# Patient Record
Sex: FEMALE | Race: BLACK OR AFRICAN AMERICAN | Employment: FULL TIME | URBAN - METROPOLITAN AREA
[De-identification: names, ages, dates, MRNs, and addresses within clinical notes are randomized per-mention and may not be internally consistent; named-entity substitution may affect disease eponyms.]

---

## 2021-01-28 ENCOUNTER — HOSPITAL ENCOUNTER (EMERGENCY)
Age: 21
Discharge: HOME | End: 2021-01-28
Attending: EMERGENCY MEDICINE
Payer: COMMERCIAL

## 2021-01-28 VITALS
TEMPERATURE: 97.3 F | RESPIRATION RATE: 16 BRPM | WEIGHT: 240 LBS | SYSTOLIC BLOOD PRESSURE: 140 MMHG | DIASTOLIC BLOOD PRESSURE: 95 MMHG | OXYGEN SATURATION: 99 % | HEART RATE: 98 BPM

## 2021-01-28 DIAGNOSIS — N39.0 URINARY TRACT INFECTION WITHOUT HEMATURIA, SITE UNSPECIFIED: Primary | ICD-10-CM

## 2021-01-28 LAB
-: ABNORMAL
AMORPHOUS: ABNORMAL
BACTERIA: ABNORMAL
BILIRUBIN URINE: NEGATIVE
CASTS UA: ABNORMAL /LPF (ref 0–8)
COLOR: YELLOW
CRYSTALS, UA: ABNORMAL /HPF
EPITHELIAL CELLS UA: ABNORMAL /HPF (ref 0–5)
GLUCOSE URINE: NEGATIVE
HCG(URINE) PREGNANCY TEST: NEGATIVE
KETONES, URINE: ABNORMAL
LEUKOCYTE ESTERASE, URINE: ABNORMAL
MUCUS: ABNORMAL
NITRITE, URINE: NEGATIVE
OTHER OBSERVATIONS UA: ABNORMAL
PH UA: 5.5 (ref 5–8)
PROTEIN UA: ABNORMAL
RBC UA: ABNORMAL /HPF (ref 0–4)
RENAL EPITHELIAL, UA: ABNORMAL /HPF
SPECIFIC GRAVITY UA: 1.02 (ref 1–1.03)
TRICHOMONAS: ABNORMAL
TURBIDITY: ABNORMAL
URINE HGB: ABNORMAL
UROBILINOGEN, URINE: NORMAL
WBC UA: ABNORMAL /HPF (ref 0–5)
YEAST: ABNORMAL

## 2021-01-28 PROCEDURE — 81001 URINALYSIS AUTO W/SCOPE: CPT

## 2021-01-28 PROCEDURE — 81025 URINE PREGNANCY TEST: CPT

## 2021-01-28 PROCEDURE — 99282 EMERGENCY DEPT VISIT SF MDM: CPT

## 2021-01-28 RX ORDER — CEPHALEXIN 500 MG/1
500 CAPSULE ORAL 4 TIMES DAILY
Qty: 28 CAPSULE | Refills: 0 | Status: SHIPPED | OUTPATIENT
Start: 2021-01-28 | End: 2021-02-04

## 2021-01-28 NOTE — PROGRESS NOTES
This patient was assigned to me by error. This patient was never interviewed nor examined by me. I did not participate in the care of this patient.     Zahra Palmer, PGY-3

## 2021-01-28 NOTE — ED PROVIDER NOTES
[address]  Emergency Department Encounter  Emergency Medicine Resident         This patient was evaluated in the Emergency Department for symptoms described in the history of present illness. He/she was evaluated in the context of the global COVID-19 pandemic, which necessitated consideration that the patient might be at risk for infection with the SARS-CoV-2 virus that causes COVID-19. Institutional protocols and algorithms that pertain to the evaluation of patients at risk for COVID-19 are in a state of rapid change based on information released by regulatory bodies including the CDC and federal and state organizations. These policies and algorithms were followed during the patient's care in the ED. Pt Name: Emigdio Liz  MRN: [id number]  [place] 2000  Date of evaluation: 1/28/21  PCP:  No primary care provider on file. CHIEF COMPLAINT       Chief Complaint   Patient presents with    Abdominal Pain     \"stomach feels tight\" lower pelvic pain    Pregnancy Test       HISTORY OF PRESENT ILLNESS  (Location/Symptom, Timing/Onset, Context/Setting, Quality, Duration, Modifying Factors, Severity.)    Emigdio Liz is a 24 y.o. female who presents with vomiting x2 with ? Streaks of blood yesterday with missed period by more than 1 month. She is sexually active with one partner not using protection. Associated epigastric abdm pain x1 week. Intermittent, no radiation, 7/10 severity. No obvious triggers. She states \"I dont take medications\". No vaginal bleeding or discharge. No urinary sx. Not coughing up blood, no bloody BMs, denies tobacco alcohol or drug use.            PAST MEDICAL / SURGICAL / SOCIAL / FAMILY HISTORY   Denies PMH and PSH     Social History     Socioeconomic History    Marital status: Single     Spouse name: Not on file    Number of children: Not on file    Years of education: Not on file    Highest education level: Not on file   Occupational History    Not on file   Social Needs    Financial resource strain: Not on file    Food insecurity     Worry: Not on file     Inability: Not on file    Transportation needs     Medical: Not on file     Non-medical: Not on file   Tobacco Use    Smoking status: Never Smoker    Smokeless tobacco: Never Used   Substance and Sexual Activity    Alcohol use: Yes    Drug use: Never    Sexual activity: Yes     Partners: Male   Lifestyle    Physical activity     Days per week: Not on file     Minutes per session: Not on file    Stress: Not on file   Relationships    Social connections     Talks on phone: Not on file     Gets together: Not on file     Attends Tenriism service: Not on file     Active member of club or organization: Not on file     Attends meetings of clubs or organizations: Not on file     Relationship status: Not on file    Intimate partner violence     Fear of current or ex partner: Not on file     Emotionally abused: Not on file     Physically abused: Not on file     Forced sexual activity: Not on file   Other Topics Concern    Not on file   Social History Narrative    Not on file       History reviewed. No pertinent family history. Allergies:    Patient has no known allergies. Home Medications:  Prior to Admission medications    Medication Sig Start Date End Date Taking? Authorizing Provider   cephALEXin (KEFLEX) 500 MG capsule Take 1 capsule by mouth 4 times daily for 7 days 1/28/21 2/4/21 Yes Fer [facility], DO       REVIEW OF SYSTEMS    (2-9 systems for level 4, 10 or more for level 5)    A 10 point review of systems was performed and negative unless otherwise specified in HPI  Gen: No Fever, No chills  EYES: No blurry visiion, no double vision  HENT: No sore throat, No runny nose.  No cough  CV: No CP , No palpitation  RESP: No SOB, No respiratory distress  GI: No N/V, No Abdm pain  : No dysuria  SKIN: No rash  MSK: No back pain, no joint pain  NEURO: No HA, no weakness  PSYCH: No SI/HI        PHYSICAL EXAM   (up to 7 for level 4, 8 or more for level 5)     INITIAL VITALS:     BP (!) 140/95   Pulse 98   Temp 97.3 °F (36.3 °C) (Oral)   Resp 16   Wt 240 lb (108.9 kg)   LMP 12/18/2020   SpO2 99%     Physical Exam  GENERAL: upon initial examination, patient is well appearing, nontoxic, and not in acute respiratory distress. Ambulating without difficulty, awake alert coop. She is drinking a coca cola without difficulty. HENT: normocephalic , nose normal,   EYES: no occular discharge, no scleral icterus  NECK: no JVD, no tracheal deviation  CV: Normal S1 S2, no MRG  PULM / CHEST: CTA Bilaterally all fields, no WRR  ABDOMEN: SNTND, No peritoneal signs   / ANORECTAL: deferred per pt request  MSK: no gross deformity, no edema, no TTP  SKIN: no rash, no erythema, cap refill < 2 sec       DIFFERENTIAL  DIAGNOSIS/ MDM   PLAN (LABS / IMAGING / EKG):  Orders Placed This Encounter   Procedures    Urinalysis with microscopic    Pregnancy, Urine       MEDICATIONS ORDERED:  Orders Placed This Encounter   Medications    cephALEXin (KEFLEX) 500 MG capsule     Sig: Take 1 capsule by mouth 4 times daily for 7 days     Dispense:  28 capsule     Refill:  0         MDM:    Brianne Burnette is a 24 y.o. female who presents with missed period, epigastric pain and vom. No sx today. Concerned she may be pregnant. Abdm is benign. Not dehydrated. No vag sx or urinary sx. Will check Preg and UA.            EMERGENCY DEPARTMENT COURSE:  ED Course as of Jan 28 1547   Thu Jan 28, 2021   1533 HCG(Urine) Pregnancy Test: NEGATIVE [RB]   1546 uti    [RB]      ED Course User Index  [RB] [facility],          DIAGNOSTIC RESULTS / EMERGENCYDEPARTMENT COURSE   LABS:  Labs Reviewed   URINALYSIS WITH MICROSCOPIC - Abnormal; Notable for the following components:       Result Value    Turbidity UA TURBID (*)     Ketones, Urine TRACE (*)     Urine Hgb TRACE (*)     Protein, UA TRACE (*)     Leukocyte Esterase, Urine MODERATE (*)     Bacteria, UA MODERATE (*)     All other components within normal limits   PREGNANCY, URINE       RADIOLOGY:  No results found. CONSULTS:  None    CRITICAL CARE:  Please see attending note    FINAL IMPRESSION     1. Urinary tract infection without hematuria, site unspecified          DISPOSITION / PLAN   DISPOSITION Decision To Discharge 01/28/2021 03:46:45 PM       Evaluation and treatment course in the ED, and plan of care upon discharge was discussed in length with the patient. Patient had no further questions prior to being discharged and was instructed to return to the ED for new or worsening symptoms. Any changes to existing medications or new prescriptions were reviewed with patient and they expressed understanding of how to correctly take their medications and the possible common side effects. The patient understands that at this time there is no evidence for a more malignant underlying process, but the patient also understands that early in the process of an illness or injury, an emergency department workup can be falsely reassuring. Routine discharge counseling was given, and the patient understands that worsening, changing or persistent symptoms should prompt an immediate call or follow up with his/her primary physician or return to the emergency department. The importance of appropriate follow up was also discussed. More extensive discharge instructions were given in the patients discharge paperwork. PATIENT REFERRED TO:  No follow-up provider specified. DISCHARGE MEDICATIONS:  New Prescriptions    CEPHALEXIN (KEFLEX) 500 MG CAPSULE    Take 1 capsule by mouth 4 times daily for 7 days       Dr. Luis Fernando Swann.  [facility]  Emergency Medicine Resident Physician, PGY-3    (Please note that portions of this note were completed with a voice recognition program.  Efforts were made to edit the dictations but occasionally words are mis-transcribed.)          Mary Barron, DO  Resident  01/28/21 1542

## 2021-01-28 NOTE — ED NOTES
Pt presented to ED for lower abd pain and missed period. Pt denies vaginal discharge or vaginal bleeding. Pt complains of nausea.       Smith Laurent RN  01/28/21 9935

## 2021-01-28 NOTE — ED PROVIDER NOTES
[address] [facility]     Emergency Department     Faculty Attestation    I performed a history and physical examination of the patient and discussed management with the resident. I have reviewed and agree with the residents findings including all diagnostic interpretations, and treatment plans as written. Any areas of disagreement are noted on the chart. I was personally present for the key portions of any procedures. I have documented in the chart those procedures where I was not present during the key portions. I have reviewed the emergency nurses triage note. I agree with the chief complaint, past medical history, past surgical history, allergies, medications, social and family history as documented unless otherwise noted below. Documentation of the HPI, Physical Exam and Medical Decision Making performed by patricioiblizet is based on my personal performance of the HPI, PE and MDM. For Physician Assistant/ Nurse Practitioner cases/documentation I have personally evaluated this patient and have completed at least one if not all key elements of the E/M (history, physical exam, and MDM). Additional findings are as noted. Well-appearing on exam with soft abdomen, that is nontender to palpation all quadrants. Reports episode of emesis with some streaks of blood present. No pain at this time. Last menstrual period was December 18. She states she is very irregular and currently late. No vaginal bleeding no vaginal discharge.   No dysuria or hematuria no back pain no fevers or chills    Gabriel Richardson D.O, M.P.H  Attending Emergency Medicine Physician         Gabriel Richardson DO  01/28/21 0527

## 2021-05-15 ENCOUNTER — HOSPITAL ENCOUNTER (EMERGENCY)
Age: 21
Discharge: HOME | End: 2021-05-15
Attending: EMERGENCY MEDICINE
Payer: COMMERCIAL

## 2021-05-15 VITALS
RESPIRATION RATE: 16 BRPM | DIASTOLIC BLOOD PRESSURE: 92 MMHG | HEART RATE: 91 BPM | OXYGEN SATURATION: 100 % | TEMPERATURE: 98.7 F | SYSTOLIC BLOOD PRESSURE: 141 MMHG

## 2021-05-15 DIAGNOSIS — N30.00 ACUTE CYSTITIS WITHOUT HEMATURIA: Primary | ICD-10-CM

## 2021-05-15 DIAGNOSIS — R03.0 BLOOD PRESSURE ELEVATED WITHOUT HISTORY OF HTN: ICD-10-CM

## 2021-05-15 LAB
-: ABNORMAL
AMORPHOUS: ABNORMAL
BACTERIA: ABNORMAL
BILIRUBIN URINE: NEGATIVE
CASTS UA: ABNORMAL /LPF (ref 0–8)
COLOR: YELLOW
COMMENT UA: ABNORMAL
CRYSTALS, UA: ABNORMAL /HPF
EPITHELIAL CELLS UA: ABNORMAL /HPF (ref 0–5)
GLUCOSE URINE: NEGATIVE
HCG(URINE) PREGNANCY TEST: NEGATIVE
KETONES, URINE: NEGATIVE
LEUKOCYTE ESTERASE, URINE: ABNORMAL
MUCUS: ABNORMAL
NITRITE, URINE: NEGATIVE
OTHER OBSERVATIONS UA: ABNORMAL
PH UA: 5.5 (ref 5–8)
PROTEIN UA: NEGATIVE
RBC UA: ABNORMAL /HPF (ref 0–4)
RENAL EPITHELIAL, UA: ABNORMAL /HPF
SPECIFIC GRAVITY UA: 1.02 (ref 1–1.03)
TRICHOMONAS: ABNORMAL
TURBIDITY: CLEAR
URINE HGB: NEGATIVE
UROBILINOGEN, URINE: NORMAL
WBC UA: ABNORMAL /HPF (ref 0–5)
YEAST: ABNORMAL

## 2021-05-15 PROCEDURE — 81025 URINE PREGNANCY TEST: CPT

## 2021-05-15 PROCEDURE — 81001 URINALYSIS AUTO W/SCOPE: CPT

## 2021-05-15 PROCEDURE — 87086 URINE CULTURE/COLONY COUNT: CPT

## 2021-05-15 PROCEDURE — 99281 EMR DPT VST MAYX REQ PHY/QHP: CPT

## 2021-05-15 RX ORDER — CEPHALEXIN 500 MG/1
500 CAPSULE ORAL 2 TIMES DAILY
Qty: 28 CAPSULE | Refills: 0 | Status: SHIPPED | OUTPATIENT
Start: 2021-05-15 | End: 2021-05-22

## 2021-05-15 ASSESSMENT — ENCOUNTER SYMPTOMS
BLOOD IN STOOL: 0
SORE THROAT: 0
NAUSEA: 0
ABDOMINAL PAIN: 0
CONSTIPATION: 0
SHORTNESS OF BREATH: 0
DIARRHEA: 0
VOMITING: 0
COUGH: 0

## 2021-05-15 NOTE — ED PROVIDER NOTES
Difficulty of Paying Living Expenses:    Food Insecurity:     Worried About Running Out of Food in the Last Year:     [address] in the Last Year:    Transportation Needs:     Lack of Transportation (Medical):  Lack of Transportation (Non-Medical):    Physical Activity:     Days of Exercise per Week:     Minutes of Exercise per Session:    Stress:     Feeling of Stress :    Social Connections:     Frequency of Communication with Friends and Family:     Frequency of Social Gatherings with Friends and Family:     Attends Anabaptist Services:     Active Member of Clubs or Organizations:     Attends Club or Organization Meetings:     Marital Status:    Intimate Partner Violence:     Fear of Current or Ex-Partner:     Emotionally Abused:     Physically Abused:     Sexually Abused:        No family history on file. Allergies:  Patient has no known allergies. Home Medications:  Prior to Admission medications    Medication Sig Start Date End Date Taking? Authorizing Provider   cephALEXin (KEFLEX) 500 MG capsule Take 1 capsule by mouth 2 times daily for 7 days 5/15/21 5/22/21 Yes Jeanne Pedro, DO       REVIEW OF SYSTEMS    (2-9 systems for level 4, 10 or more for level 5)      Review of Systems   Constitutional: Negative for chills, diaphoresis, fatigue and fever. HENT: Negative for congestion and sore throat. Eyes: Negative for visual disturbance. Respiratory: Negative for cough and shortness of breath. Cardiovascular: Negative for chest pain, palpitations and leg swelling. Gastrointestinal: Negative for abdominal pain, blood in stool, constipation, diarrhea, nausea and vomiting. Endocrine: Negative for polydipsia, polyphagia and polyuria. Genitourinary: Positive for frequency (Increased). Negative for difficulty urinating, dysuria, flank pain, hematuria, urgency, vaginal bleeding and vaginal discharge. Musculoskeletal: Negative for arthralgias and myalgias.    Skin: oriented to person, place, and time. Motor: No abnormal muscle tone. DIFFERENTIAL  DIAGNOSIS     PLAN (LABS / IMAGING / EKG):  Orders Placed This Encounter   Procedures    Culture, Urine    Urinalysis Reflex to Culture    PREGNANCY, URINE    Microscopic Urinalysis       MEDICATIONS ORDERED:  Orders Placed This Encounter   Medications    cephALEXin (KEFLEX) 500 MG capsule     Sig: Take 1 capsule by mouth 2 times daily for 7 days     Dispense:  28 capsule     Refill:  0       DDX: UTI, pregnancy    DIAGNOSTIC RESULTS / EMERGENCY DEPARTMENT COURSE / MDM   LAB RESULTS:  Results for orders placed or performed during the hospital encounter of 05/15/21   Urinalysis Reflex to Culture    Specimen: Urine, clean catch   Result Value Ref Range    Color, UA YELLOW YELLOW    Turbidity UA CLEAR CLEAR    Glucose, Ur NEGATIVE NEGATIVE    Bilirubin Urine NEGATIVE NEGATIVE    Ketones, Urine NEGATIVE NEGATIVE    Specific Gravity, UA 1.023 1.005 - 1.030    Urine Hgb NEGATIVE NEGATIVE    pH, UA 5.5 5.0 - 8.0    Protein, UA NEGATIVE NEGATIVE    Urobilinogen, Urine Normal Normal    Nitrite, Urine NEGATIVE NEGATIVE    Leukocyte Esterase, Urine MODERATE (A) NEGATIVE    Urinalysis Comments NOT REPORTED    PREGNANCY, URINE   Result Value Ref Range    HCG(Urine) Pregnancy Test NEGATIVE NEGATIVE   Microscopic Urinalysis   Result Value Ref Range    -          WBC, UA 50  0 - 5 /HPF    RBC, UA 2 TO 5 0 - 4 /HPF    Casts UA  0 - 8 /LPF     5 TO 10 HYALINE Reference range defined for non-centrifuged specimen. Crystals, UA NOT REPORTED None /HPF    Epithelial Cells UA 5 TO 10 0 - 5 /HPF    Renal Epithelial, UA NOT REPORTED 0 /HPF    Bacteria, UA FEW (A) None    Mucus, UA NOT REPORTED None    Trichomonas, UA NOT REPORTED None    Amorphous, UA NOT REPORTED None    Other Observations UA NOT REPORTED NOT REQ.     Yeast, UA NOT REPORTED None       IMPRESSION: 23-year-old female presents for urinary frequencies with concerns for UTI.  Patient peers to be no acute distress nontoxic-appearing. Patient initial vitals are concerning for slight hypertension 141/92 does not need to medically manage at this time. Patient's vitals are otherwise stable nonconcerning. Abdomen is benign. No signs respiratory distress. Cap refill is in 2. Concern for above differential diagnosis. Will order urine dialysis with reflex culture and urine pregnancy test.  Plan to discharge home and have her follow-up with primary care provider for reevaluation of her hypertension and possible UTI. RADIOLOGY:  none    EKG  none    All EKG's are interpreted by the Emergency Department Physician who either signs or Co-signs this chart in the absence of a cardiologist.    EMERGENCY DEPARTMENT COURSE:  ED Course as of May 15 1225   Sat May 15, 2021   1153 Leukocyte Esterase, Urine(!): MODERATE [CS]   1153 WBC, UA: 50  [CS]   9949 Since she is symptomatic, will treat with keflex and have her follow up with her PCP in 5 days. Bacteria, UA(!): FEW [CS]   2005 She was updated on her blood pressure and urinary tract infection. Patient was given a dose of Keflex while here. Patient was agreeable with discharge plan. Patient was educated return precautions. [CS]      ED Course User Index  [CS] Gemini Fails, DO         PROCEDURES:  none    CONSULTS:  None    CRITICAL CARE:  Please see attending note    FINAL IMPRESSION      1. Acute cystitis without hematuria    2.  Blood pressure elevated without history of HTN          DISPOSITION / PLAN     DISPOSITION Decision To Discharge 05/15/2021 11:54:00 AM      PATIENT REFERRED TO:  [facility] ED  [address] [place] [address]  [phone]  Go to   If symptoms worsen      DISCHARGE MEDICATIONS:  Discharge Medication List as of 5/15/2021 12:02 PM      START taking these medications    Details   cephALEXin (KEFLEX) 500 MG capsule Take 1 capsule by mouth 2 times daily for 7 days, Disp-28 capsule, R-0Normal             Jami Rodriguez DO  Emergency Medicine Resident    (Please note that portions of thisnote were completed with a voice recognition program.  Efforts were made to edit the dictations but occasionally words are mis-transcribed.)       Jami Rodriguez DO  Resident  05/15/21 1739

## 2021-05-15 NOTE — ED PROVIDER NOTES
[address] [facility]     Emergency Department     Faculty Attestation    I performed a history and physical examination of the patient and discussed management with the resident. I have reviewed and agree with the residents findings including all diagnostic interpretations, and treatment plans as written at the time of my review. Any areas of disagreement are noted on the chart. I was personally present for the key portions of any procedures. I have documented in the chart those procedures where I was not present during the key portions. For Physician Assistant/ Nurse Practitioner cases/documentation I have personally evaluated this patient and have completed at least one if not all key elements of the E/M (history, physical exam, and MDM). Additional findings are as noted. This patient was evaluated in the Emergency Department for symptoms described in the history of present illness. The patient was evaluated in the context of the global COVID-19 pandemic, which necessitated consideration that the patient might be at risk for infection with the SARS-CoV-2 virus that causes COVID-19. Institutional protocols and algorithms that pertain to the evaluation of patients at risk for COVID-19 are in a state of rapid change based on information released by regulatory bodies including the CDC and federal and state organizations. These policies and algorithms were followed during the patient's care in the ED. Primary Care Physician: No primary care provider on file. History: This is a 24 y.o. female who presents to the Emergency Department with complaint of concerns for urinary tract flexion. The patient states she was given a prescription for urine tract flexion proxy 1 month ago but lost prescription. Says she has no dysuria but does have some increased frequency of urination. Last menstrual period was last week. She denies any abdominal pain.   She denies any

## 2021-05-16 LAB
CULTURE: NORMAL
Lab: NORMAL
SPECIMEN DESCRIPTION: NORMAL

## 2021-06-11 ENCOUNTER — HOSPITAL ENCOUNTER (EMERGENCY)
Age: 21
Discharge: HOME | End: 2021-06-11
Attending: EMERGENCY MEDICINE
Payer: COMMERCIAL

## 2021-06-11 VITALS
DIASTOLIC BLOOD PRESSURE: 89 MMHG | TEMPERATURE: 99.2 F | SYSTOLIC BLOOD PRESSURE: 131 MMHG | OXYGEN SATURATION: 98 % | RESPIRATION RATE: 16 BRPM | HEART RATE: 67 BPM

## 2021-06-11 DIAGNOSIS — H10.9 CONJUNCTIVITIS OF LEFT EYE, UNSPECIFIED CONJUNCTIVITIS TYPE: Primary | ICD-10-CM

## 2021-06-11 PROCEDURE — 6370000000 HC RX 637 (ALT 250 FOR IP): Performed by: STUDENT IN AN ORGANIZED HEALTH CARE EDUCATION/TRAINING PROGRAM

## 2021-06-11 PROCEDURE — 99283 EMERGENCY DEPT VISIT LOW MDM: CPT

## 2021-06-11 RX ORDER — POLYMYXIN B SULFATE AND TRIMETHOPRIM 1; 10000 MG/ML; [USP'U]/ML
2 SOLUTION OPHTHALMIC EVERY 6 HOURS
Qty: 1 BOTTLE | Refills: 0 | Status: SHIPPED | OUTPATIENT
Start: 2021-06-11 | End: 2021-06-18

## 2021-06-11 RX ORDER — POLYMYXIN B SULFATE AND TRIMETHOPRIM 1; 10000 MG/ML; [USP'U]/ML
2 SOLUTION OPHTHALMIC EVERY 6 HOURS SCHEDULED
Status: DISCONTINUED | OUTPATIENT
Start: 2021-06-11 | End: 2021-06-11 | Stop reason: HOSPADM

## 2021-06-11 RX ADMIN — POLYMYXIN B SULFATE AND TRIMETHOPRIM 2 DROP: 10000; 1 SOLUTION OPHTHALMIC at 11:35

## 2021-06-11 ASSESSMENT — ENCOUNTER SYMPTOMS
EYE REDNESS: 1
SORE THROAT: 0
EYE DISCHARGE: 1
SINUS PAIN: 0
EYE PAIN: 1
PHOTOPHOBIA: 0
TROUBLE SWALLOWING: 0

## 2021-06-11 ASSESSMENT — PAIN DESCRIPTION - DESCRIPTORS: DESCRIPTORS: BURNING;CONSTANT

## 2021-06-11 ASSESSMENT — PAIN DESCRIPTION - ORIENTATION: ORIENTATION: LEFT

## 2021-06-11 ASSESSMENT — PAIN SCALES - GENERAL: PAINLEVEL_OUTOF10: 8

## 2021-06-11 ASSESSMENT — PAIN DESCRIPTION - LOCATION: LOCATION: EYE

## 2021-06-11 ASSESSMENT — PAIN DESCRIPTION - PAIN TYPE: TYPE: ACUTE PAIN

## 2021-06-11 NOTE — ED PROVIDER NOTES
(Non-Medical):    Physical Activity:     Days of Exercise per Week:     Minutes of Exercise per Session:    Stress:     Feeling of Stress :    Social Connections:     Frequency of Communication with Friends and Family:     Frequency of Social Gatherings with Friends and Family:     Attends Voodoo Services:     Active Member of Clubs or Organizations:     Attends Club or Organization Meetings:     Marital Status:    Intimate Partner Violence:     Fear of Current or Ex-Partner:     Emotionally Abused:     Physically Abused:     Sexually Abused:        History reviewed. No pertinent family history. Allergies:  Patient has no known allergies. Home Medications:  Prior to Admission medications    Medication Sig Start Date End Date Taking? Authorizing Provider   trimethoprim-polymyxin b (POLYTRIM) 97656-6.1 UNIT/ML-% ophthalmic solution Place 2 drops into the left eye every 6 hours for 7 days 6/11/21 6/18/21 Yes Bety Reid MD       REVIEW OF SYSTEMS    (2-9 systems for level 4, 10 or more for level 5)      Review of Systems   Constitutional: Negative for fatigue and fever. HENT: Negative for sinus pain, sore throat and trouble swallowing. Eyes: Positive for pain, discharge and redness. Negative for photophobia and visual disturbance. Skin: Negative for wound. Neurological: Negative for dizziness, syncope, facial asymmetry, weakness, light-headedness, numbness and headaches. Psychiatric/Behavioral: Negative for confusion. PHYSICAL EXAM   (up to 7 for level 4, 8 or more for level 5)      INITIAL VITALS:   /89   Pulse 67   Temp 99.2 °F (37.3 °C) (Oral)   Resp 16   SpO2 98%     Physical Exam  Vitals and nursing note reviewed. Constitutional:       General: She is not in acute distress. Appearance: Normal appearance. She is normal weight. She is not ill-appearing or toxic-appearing. HENT:      Head: Normocephalic and atraumatic.       Right Ear: External ear normal. Left Ear: External ear normal.      Nose: Nose normal.      Mouth/Throat:      Mouth: Mucous membranes are moist.      Pharynx: No oropharyngeal exudate. Eyes:      General:         Right eye: No discharge. Left eye: Discharge present. Extraocular Movements: Extraocular movements intact. Conjunctiva/sclera:      Right eye: Right conjunctiva is not injected. No exudate. Left eye: Left conjunctiva is injected. Exudate (Clear) present. Pupils: Pupils are equal, round, and reactive to light. Left eye: No corneal abrasion or fluorescein uptake. Cardiovascular:      Rate and Rhythm: Normal rate. Pulmonary:      Effort: Pulmonary effort is normal. No respiratory distress. Musculoskeletal:      Cervical back: Normal range of motion and neck supple. No rigidity. Skin:     Capillary Refill: Capillary refill takes less than 2 seconds. Neurological:      General: No focal deficit present. Mental Status: She is alert and oriented to person, place, and time. Cranial Nerves: No cranial nerve deficit. Gait: Gait normal.   Psychiatric:         Mood and Affect: Mood normal.         Behavior: Behavior normal.         DIFFERENTIAL  DIAGNOSIS     PLAN (LABS / IMAGING / EKG):  No orders of the defined types were placed in this encounter. MEDICATIONS ORDERED:  Orders Placed This Encounter   Medications    trimethoprim-polymyxin b (POLYTRIM) 22920-4.1 UNIT/ML-% ophthalmic solution     Sig: Place 2 drops into the left eye every 6 hours for 7 days     Dispense:  1 Bottle     Refill:  0    DISCONTD: trimethoprim-polymyxin b (POLYTRIM) ophthalmic solution 2 drop       DDX: Viral conjunctivitis versus bacterial conjunctivitis versus allergic conjunctivitis versus foreign body versus other    DIAGNOSTIC RESULTS / EMERGENCY DEPARTMENT COURSE / MDM     LABS:  No results found for this visit on 06/11/21.       RADIOLOGY:  None    EKG  None    All EKG's are interpreted by the Emergency Department Physician who either signs or Co-signs this chart in the absence of a cardiologist.    EMERGENCY DEPARTMENT COURSE:  Patient found seated upright in chair, no acute distress, not ill or toxic appearing. Engaged and cooperative exam.  Cranial nerves intact, no visual deficits, no photophobia. Fluorescein stain negative for uptake in the left eye. Findings consistent with viral versus bacterial conjunctivitis. Will empirically treat with Polytrim drops, first dose in the emergency department. Patient given referral to establish care with PCP to follow-up. Discharge plan discussed with patient who is in agreement. Educated on likely pathology, medications, return precautions, and follow-up. Patient understood all educated materials with all questions answered to their satisfaction. PROCEDURES:  None    CONSULTS:  None    CRITICAL CARE:  None    FINAL IMPRESSION      1.  Conjunctivitis of left eye, unspecified conjunctivitis type          DISPOSITION / PLAN     DISPOSITION Decision To Discharge 06/11/2021 11:16:13 AM      PATIENT REFERRED TO:  [facility]  [address] [facility] [address]  [phone]  Schedule an appointment as soon as possible for a visit   To establish care, Regarding this visit    [facility]  [address] [facility] [address]  [phone]  Go to   If symptoms worsen      DISCHARGE MEDICATIONS:  Discharge Medication List as of 6/11/2021 11:20 AM      START taking these medications    Details   trimethoprim-polymyxin b (POLYTRIM) 50102-8.1 UNIT/ML-% ophthalmic solution Place 2 drops into the left eye every 6 hours for 7 days, Disp-1 Bottle, R-0Print             Albin Casarez MD  Emergency Medicine Resident    (Please note that portions of this note were completed with a voice recognition program.  Efforts were made to edit the dictations but occasionally words are mis-transcribed.)       Albin Casarez MD  Resident  06/11/21 0071

## 2021-06-11 NOTE — ED PROVIDER NOTES
[facility] ED     Emergency Department     Faculty Attestation        I performed a history and physical examination of the patient and discussed management with the resident. I reviewed the residents note and agree with the documented findings and plan of care. Any areas of disagreement are noted on the chart. I was personally present for the key portions of any procedures. I have documented in the chart those procedures where I was not present during the key portions. I have reviewed the emergency nurses triage note. I agree with the chief complaint, past medical history, past surgical history, allergies, medications, social and family history as documented unless otherwise noted below. For Physician Assistant/ Nurse Practitioner cases/documentation I have personally evaluated this patient and have completed at least one if not all key elements of the E/M (history, physical exam, and MDM). Additional findings are as noted. PCP:  No primary care provider on file. Pertinent Comments:         Critical Care  None    This patient was evaluated in the Emergency Department for symptoms described in the history of present illness. He/she was evaluated in the context of the global COVID-19 pandemic, which necessitated consideration that the patient might be at risk for infection with the SARS-CoV-2 virus that causes COVID-19. Institutional protocols and algorithms that pertain to the evaluation of patients at risk for COVID-19 are in a state of rapid change based on information released by regulatory bodies including the CDC and federal and state organizations. These policies and algorithms were followed during the patient's care in the ED. (Please note that portions of this note were completed with a voice recognition program. Efforts were made to edit the dictations but occasionally words are mis-transcribed.  Whenever words are used in this note in any gender, they shall be construed as though they were used in the gender appropriate to the circumstances; and whenever words are used in this note in the singular or plural form, they shall be construed as though they were used in the form appropriate to the circumstances.)    MD Antonio Bruce  Attending Emergency Medicine Physician             Jurgen Wilkins MD  06/11/21 6874

## 2021-06-13 ENCOUNTER — APPOINTMENT (OUTPATIENT)
Dept: ULTRASOUND IMAGING | Age: 21
End: 2021-06-13
Payer: COMMERCIAL

## 2021-06-13 ENCOUNTER — HOSPITAL ENCOUNTER (EMERGENCY)
Age: 21
Discharge: HOME | End: 2021-06-13
Attending: EMERGENCY MEDICINE
Payer: COMMERCIAL

## 2021-06-13 VITALS
SYSTOLIC BLOOD PRESSURE: 136 MMHG | OXYGEN SATURATION: 99 % | DIASTOLIC BLOOD PRESSURE: 99 MMHG | HEART RATE: 105 BPM | TEMPERATURE: 98.4 F | RESPIRATION RATE: 16 BRPM | WEIGHT: 230 LBS | BODY MASS INDEX: 38.32 KG/M2 | HEIGHT: 65 IN

## 2021-06-13 DIAGNOSIS — O99.891 ASYMPTOMATIC BACTERIURIA DURING PREGNANCY: ICD-10-CM

## 2021-06-13 DIAGNOSIS — R82.71 ASYMPTOMATIC BACTERIURIA DURING PREGNANCY: ICD-10-CM

## 2021-06-13 DIAGNOSIS — N76.0 BV (BACTERIAL VAGINOSIS): ICD-10-CM

## 2021-06-13 DIAGNOSIS — B96.89 BV (BACTERIAL VAGINOSIS): ICD-10-CM

## 2021-06-13 DIAGNOSIS — Z34.90 PREGNANCY, UNSPECIFIED GESTATIONAL AGE: Primary | ICD-10-CM

## 2021-06-13 LAB
-: ABNORMAL
ABO/RH: NORMAL
ABSOLUTE EOS #: 0.16 K/UL (ref 0–0.44)
ABSOLUTE IMMATURE GRANULOCYTE: 0.06 K/UL (ref 0–0.3)
ABSOLUTE LYMPH #: 3.11 K/UL (ref 1.2–5.2)
ABSOLUTE MONO #: 1.04 K/UL (ref 0.1–1.4)
ALBUMIN SERPL-MCNC: 3.9 G/DL (ref 3.5–5.2)
ALBUMIN/GLOBULIN RATIO: 1 (ref 1–2.5)
ALP BLD-CCNC: 87 U/L (ref 35–104)
ALT SERPL-CCNC: 14 U/L (ref 5–33)
AMORPHOUS: ABNORMAL
ANION GAP SERPL CALCULATED.3IONS-SCNC: 11 MMOL/L (ref 9–17)
AST SERPL-CCNC: 17 U/L
BACTERIA: ABNORMAL
BASOPHILS # BLD: 0 % (ref 0–2)
BASOPHILS ABSOLUTE: 0.05 K/UL (ref 0–0.2)
BILIRUB SERPL-MCNC: <0.1 MG/DL (ref 0.3–1.2)
BILIRUBIN URINE: NEGATIVE
BUN BLDV-MCNC: 6 MG/DL (ref 6–20)
BUN/CREAT BLD: ABNORMAL (ref 9–20)
CALCIUM SERPL-MCNC: 8.9 MG/DL (ref 8.6–10.4)
CASTS UA: ABNORMAL /LPF (ref 0–2)
CHLORIDE BLD-SCNC: 104 MMOL/L (ref 98–107)
CO2: 22 MMOL/L (ref 20–31)
COLOR: YELLOW
CREAT SERPL-MCNC: 0.68 MG/DL (ref 0.5–0.9)
CRYSTALS, UA: ABNORMAL /HPF
CRYSTALS, UA: ABNORMAL /HPF
DIFFERENTIAL TYPE: ABNORMAL
DIRECT EXAM: ABNORMAL
EOSINOPHILS RELATIVE PERCENT: 1 % (ref 1–4)
EPITHELIAL CELLS UA: ABNORMAL /HPF (ref 0–5)
GFR AFRICAN AMERICAN: ABNORMAL ML/MIN
GFR NON-AFRICAN AMERICAN: ABNORMAL ML/MIN
GFR SERPL CREATININE-BSD FRML MDRD: ABNORMAL ML/MIN/{1.73_M2}
GFR SERPL CREATININE-BSD FRML MDRD: ABNORMAL ML/MIN/{1.73_M2}
GLUCOSE BLD-MCNC: 89 MG/DL (ref 70–99)
GLUCOSE URINE: NEGATIVE
HCG QUANTITATIVE: 1917 IU/L
HCG(URINE) PREGNANCY TEST: POSITIVE
HCT VFR BLD CALC: 37.7 % (ref 36.3–47.1)
HEMOGLOBIN: 12.6 G/DL (ref 11.9–15.1)
IMMATURE GRANULOCYTES: 1 %
KETONES, URINE: ABNORMAL
LEUKOCYTE ESTERASE, URINE: ABNORMAL
LYMPHOCYTES # BLD: 25 % (ref 25–45)
Lab: ABNORMAL
MCH RBC QN AUTO: 30.8 PG (ref 25.2–33.5)
MCHC RBC AUTO-ENTMCNC: 33.4 G/DL (ref 28.4–34.8)
MCV RBC AUTO: 92.2 FL (ref 82.6–102.9)
MONOCYTES # BLD: 9 % (ref 2–8)
MUCUS: ABNORMAL
NITRITE, URINE: NEGATIVE
NRBC AUTOMATED: 0 PER 100 WBC
OTHER OBSERVATIONS UA: ABNORMAL
PDW BLD-RTO: 12.7 % (ref 11.8–14.4)
PH UA: 6 (ref 5–8)
PLATELET # BLD: 288 K/UL (ref 138–453)
PLATELET ESTIMATE: ABNORMAL
PMV BLD AUTO: 10.4 FL (ref 8.1–13.5)
POTASSIUM SERPL-SCNC: 3.6 MMOL/L (ref 3.7–5.3)
PROTEIN UA: NEGATIVE
RBC # BLD: 4.09 M/UL (ref 3.95–5.11)
RBC # BLD: ABNORMAL 10*6/UL
RBC UA: ABNORMAL /HPF (ref 0–2)
RENAL EPITHELIAL, UA: ABNORMAL /HPF
SEG NEUTROPHILS: 64 % (ref 34–64)
SEGMENTED NEUTROPHILS ABSOLUTE COUNT: 7.81 K/UL (ref 1.8–8)
SODIUM BLD-SCNC: 137 MMOL/L (ref 135–144)
SPECIFIC GRAVITY UA: 1.03 (ref 1–1.03)
SPECIMEN DESCRIPTION: ABNORMAL
TOTAL PROTEIN: 7.8 G/DL (ref 6.4–8.3)
TRICHOMONAS: ABNORMAL
TURBIDITY: ABNORMAL
URINE HGB: NEGATIVE
UROBILINOGEN, URINE: NORMAL
WBC # BLD: 12.2 K/UL (ref 4.5–13.5)
WBC # BLD: ABNORMAL 10*3/UL
WBC UA: ABNORMAL /HPF (ref 0–5)
YEAST: ABNORMAL

## 2021-06-13 PROCEDURE — 87510 GARDNER VAG DNA DIR PROBE: CPT

## 2021-06-13 PROCEDURE — 87591 N.GONORRHOEAE DNA AMP PROB: CPT

## 2021-06-13 PROCEDURE — 99283 EMERGENCY DEPT VISIT LOW MDM: CPT

## 2021-06-13 PROCEDURE — 86901 BLOOD TYPING SEROLOGIC RH(D): CPT

## 2021-06-13 PROCEDURE — 81025 URINE PREGNANCY TEST: CPT

## 2021-06-13 PROCEDURE — 87491 CHLMYD TRACH DNA AMP PROBE: CPT

## 2021-06-13 PROCEDURE — 80053 COMPREHEN METABOLIC PANEL: CPT

## 2021-06-13 PROCEDURE — 76817 TRANSVAGINAL US OBSTETRIC: CPT

## 2021-06-13 PROCEDURE — 81001 URINALYSIS AUTO W/SCOPE: CPT

## 2021-06-13 PROCEDURE — 87480 CANDIDA DNA DIR PROBE: CPT

## 2021-06-13 PROCEDURE — 87660 TRICHOMONAS VAGIN DIR PROBE: CPT

## 2021-06-13 PROCEDURE — 84702 CHORIONIC GONADOTROPIN TEST: CPT

## 2021-06-13 PROCEDURE — 87086 URINE CULTURE/COLONY COUNT: CPT

## 2021-06-13 PROCEDURE — 86900 BLOOD TYPING SEROLOGIC ABO: CPT

## 2021-06-13 PROCEDURE — 85025 COMPLETE CBC W/AUTO DIFF WBC: CPT

## 2021-06-13 RX ORDER — PRENATAL WITH FERROUS FUM AND FOLIC ACID 3080; 920; 120; 400; 22; 1.84; 3; 20; 10; 1; 12; 200; 27; 25; 2 [IU]/1; [IU]/1; MG/1; [IU]/1; MG/1; MG/1; MG/1; MG/1; MG/1; MG/1; UG/1; MG/1; MG/1; MG/1; MG/1
1 TABLET ORAL DAILY
Qty: 30 TABLET | Refills: 0 | Status: SHIPPED | OUTPATIENT
Start: 2021-06-13 | End: 2021-10-11 | Stop reason: SDUPTHER

## 2021-06-13 RX ORDER — CEPHALEXIN 500 MG/1
500 CAPSULE ORAL 2 TIMES DAILY
Qty: 14 CAPSULE | Refills: 0 | Status: SHIPPED | OUTPATIENT
Start: 2021-06-13 | End: 2021-06-20

## 2021-06-13 ASSESSMENT — ENCOUNTER SYMPTOMS
NAUSEA: 0
EYE REDNESS: 0
RECTAL PAIN: 0
ABDOMINAL PAIN: 1
COUGH: 0
BACK PAIN: 0
SHORTNESS OF BREATH: 0
RHINORRHEA: 0
EYE ITCHING: 0
SORE THROAT: 0

## 2021-06-13 NOTE — ED PROVIDER NOTES
Financial Resource Strain:     Difficulty of Paying Living Expenses:    Food Insecurity:     Worried About Running Out of Food in the Last Year:     [address] in the Last Year:    Transportation Needs:     Lack of Transportation (Medical):  Lack of Transportation (Non-Medical):    Physical Activity:     Days of Exercise per Week:     Minutes of Exercise per Session:    Stress:     Feeling of Stress :    Social Connections:     Frequency of Communication with Friends and Family:     Frequency of Social Gatherings with Friends and Family:     Attends [employer] Services:     Active Member of Clubs or Organizations:     Attends Club or Organization Meetings:     Marital Status:    Intimate Partner Violence:     Fear of Current or Ex-Partner:     Emotionally Abused:     Physically Abused:     Sexually Abused:        History reviewed. No pertinent family history. Allergies:  Patient has no known allergies. Home Medications:  Prior to Admission medications    Medication Sig Start Date End Date Taking? Authorizing Provider   trimethoprim-polymyxin b (POLYTRIM) 49515-8.1 UNIT/ML-% ophthalmic solution Place 2 drops into the left eye every 6 hours for 7 days 6/11/21 6/18/21  Nichelle Bass MD       REVIEW OF SYSTEMS    (2-9 systems for level 4, 10 or more for level 5)      Review of Systems   Constitutional: Negative for chills and fever. HENT: Negative for rhinorrhea and sore throat. Eyes: Negative for redness, itching and visual disturbance. Respiratory: Negative for cough and shortness of breath. Cardiovascular: Negative for chest pain and leg swelling. Gastrointestinal: Positive for abdominal pain. Negative for nausea and rectal pain. Genitourinary: Positive for vaginal bleeding. Negative for dysuria, frequency, hematuria and vaginal discharge. Musculoskeletal: Negative for back pain. Allergic/Immunologic: Negative for environmental allergies and food allergies. Neurological: Negative for light-headedness and headaches. PHYSICAL EXAM   (up to 7 for level 4, 8 or more for level 5)      INITIAL VITALS:   BP (!) 136/99   Pulse 105   Temp 98.4 °F (36.9 °C)   Resp 16   Ht 5' 5\" (1.651 m)   Wt 230 lb (104.3 kg)   LMP 05/03/2021   SpO2 99%   BMI 38.27 kg/m²     Physical Exam  Vitals and nursing note reviewed. Constitutional:       General: She is not in acute distress. Appearance: Normal appearance. She is not ill-appearing, toxic-appearing or diaphoretic. HENT:      Head: Normocephalic and atraumatic. Eyes:      General: No scleral icterus. Conjunctiva/sclera: Conjunctivae normal.   Cardiovascular:      Rate and Rhythm: Normal rate and regular rhythm. Pulmonary:      Effort: Pulmonary effort is normal. No respiratory distress. Breath sounds: Normal breath sounds. No stridor. No wheezing, rhonchi or rales. Abdominal:      General: There is no distension. Palpations: Abdomen is soft. There is no mass. Tenderness: There is no abdominal tenderness. There is no right CVA tenderness, left CVA tenderness, guarding or rebound. Musculoskeletal:      Cervical back: Neck supple. Right lower leg: No edema. Left lower leg: No edema. Skin:     General: Skin is warm and dry. Coloration: Skin is not jaundiced. Neurological:      General: No focal deficit present. Mental Status: She is alert and oriented to person, place, and time.    Psychiatric:         Mood and Affect: Mood normal.         Behavior: Behavior normal.         DIAGNOSTICS     PLAN (LABS / IMAGING / EKG):  Orders Placed This Encounter   Procedures    Culture, Urine    VAGINITIS DNA PROBE    C.trachomatis N.gonorrhoeae DNA    US OB TRANSVAGINAL    Urinalysis with microscopic    PREGNANCY, URINE    COMPREHENSIVE METABOLIC PANEL    HCG, QUANTITATIVE, PREGNANCY    CBC WITH AUTO DIFFERENTIAL    ABO/RH       MEDICATIONS ORDERED:  No orders of the defined types were placed in this encounter. DIAGNOSTIC RESULTS / EMERGENCYDEPARTMENT COURSE / MDM     LABS:  Results for orders placed or performed during the hospital encounter of 06/13/21   Urinalysis with microscopic   Result Value Ref Range    Color, UA YELLOW YELLOW    Turbidity UA CLOUDY (A) CLEAR    Glucose, Ur NEGATIVE NEGATIVE    Bilirubin Urine NEGATIVE NEGATIVE    Ketones, Urine TRACE (A) NEGATIVE    Specific Gravity, UA 1.027 1.005 - 1.030    Urine Hgb NEGATIVE NEGATIVE    pH, UA 6.0 5.0 - 8.0    Protein, UA NEGATIVE NEGATIVE    Urobilinogen, Urine Normal Normal    Nitrite, Urine NEGATIVE NEGATIVE    Leukocyte Esterase, Urine MODERATE (A) NEGATIVE    -          WBC, UA 10 TO 20 0 - 5 /HPF    RBC, UA 2 TO 5 0 - 2 /HPF    Casts UA NOT REPORTED 0 - 2 /LPF    Crystals, UA CALCIUM OXALATE (A) None /HPF    Crystals, UA MODERATE (A) None /HPF    Epithelial Cells UA 10 TO 20 0 - 5 /HPF    Renal Epithelial, UA NOT REPORTED 0 /HPF    Bacteria, UA MODERATE (A) None    Mucus, UA NOT REPORTED None    Trichomonas, UA NOT REPORTED None    Amorphous, UA NOT REPORTED None    Other Observations UA NOT REPORTED NOT REQ. Yeast, UA NOT REPORTED None   PREGNANCY, URINE   Result Value Ref Range    HCG(Urine) Pregnancy Test POSITIVE (A) NEGATIVE   COMPREHENSIVE METABOLIC PANEL   Result Value Ref Range    Glucose 89 70 - 99 mg/dL    BUN 6 6 - 20 mg/dL    CREATININE 0.68 0.50 - 0.90 mg/dL    Bun/Cre Ratio NOT REPORTED 9 - 20    Calcium 8.9 8.6 - 10.4 mg/dL    Sodium 137 135 - 144 mmol/L    Potassium 3.6 (L) 3.7 - 5.3 mmol/L    Chloride 104 98 - 107 mmol/L    CO2 22 20 - 31 mmol/L    Anion Gap 11 9 - 17 mmol/L    Alkaline Phosphatase 87 35 - 104 U/L    ALT 14 5 - 33 U/L    AST 17 <32 U/L    Total Bilirubin <0.10 (L) 0.3 - 1.2 mg/dL    Total Protein 7.8 6.4 - 8.3 g/dL    Albumin 3.9 3.5 - 5.2 g/dL    Albumin/Globulin Ratio 1.0 1.0 - 2.5    GFR Non-African American  >60 mL/min     Pediatric GFR requires additional information. Refer to Critical access hospital website for calculator. GFR  NOT REPORTED >60 mL/min    GFR Comment          GFR Staging NOT REPORTED    HCG, QUANTITATIVE, PREGNANCY   Result Value Ref Range    hCG Quant 1,917 (H) <5 IU/L   CBC WITH AUTO DIFFERENTIAL   Result Value Ref Range    WBC 12.2 4.5 - 13.5 k/uL    RBC 4.09 3.95 - 5.11 m/uL    Hemoglobin 12.6 11.9 - 15.1 g/dL    Hematocrit 37.7 36.3 - 47.1 %    MCV 92.2 82.6 - 102.9 fL    MCH 30.8 25.2 - 33.5 pg    MCHC 33.4 28.4 - 34.8 g/dL    RDW 12.7 11.8 - 14.4 %    Platelets 868 774 - 081 k/uL    MPV 10.4 8.1 - 13.5 fL    NRBC Automated 0.0 0.0 per 100 WBC    Differential Type NOT REPORTED     Seg Neutrophils 64 34 - 64 %    Lymphocytes 25 25 - 45 %    Monocytes 9 (H) 2 - 8 %    Eosinophils % 1 1 - 4 %    Basophils 0 0 - 2 %    Immature Granulocytes 1 (H) 0 %    Segs Absolute 7.81 1.80 - 8.00 k/uL    Absolute Lymph # 3.11 1.20 - 5.20 k/uL    Absolute Mono # 1.04 0.10 - 1.40 k/uL    Absolute Eos # 0.16 0.00 - 0.44 k/uL    Basophils Absolute 0.05 0.00 - 0.20 k/uL    Absolute Immature Granulocyte 0.06 0.00 - 0.30 k/uL    WBC Morphology NOT REPORTED     RBC Morphology NOT REPORTED     Platelet Estimate NOT REPORTED    ABO/RH   Result Value Ref Range    ABO/Rh O POSITIVE        RADIOLOGY:  US OB TRANSVAGINAL    (Results Pending)        EMERGENCY DEPARTMENT COURSE:         MDM: 24year-old G1,  about 3 weeks gestation by mouth menstrual period presenting with concern for pregnancy as well as mild abdominal pain. On exam she is nontoxic-appearing no acute distress. Her vitals are unremarkable. Heart regular rate and rhythm, lungs are clear to auscultation bilaterally. Abdomen soft nontender. No CVA tenderness. No lower extremity edema noted. Differential diagnosis includes pregnancy, ectopic pregnancy, UTI, abnormal uterine bleeding, among others. Plan is for urinalysis, urine pregnancy.   If positive will then pursue blood work, transvaginal ultrasound to rule ectopic pregnancy. Cannot visualize cervix on pelvic examination but no cervical motion tenderness no adnexal tenderness. No blood in the vaginal vault. Awaiting remainder of blood work and transvaginal ultrasound. Patient will be signed out to oncoming resident pending results. PROCEDURES:  None    CONSULTS:  None    FINAL IMPRESSION      1. Pregnancy, unspecified gestational age          [de-identified] / PLAN     DISPOSITION        PATIENT REFERRED TO:  No follow-up provider specified.     DISCHARGE MEDICATIONS:  New Prescriptions    No medications on file       Mary Gupta DO  Emergency Medicine Resident  [facility]    (Please note that portions of this note were completed with a voice recognition program.  Efforts were made to edit thedictations but occasionally words are mis-transcribed.)       Mary Gupta DO  Resident  06/13/21 [id number]

## 2021-06-13 NOTE — ED PROVIDER NOTES
[facility]     Emergency Department     Faculty Attestation        I performed a history and physical examination of the patient and discussed management with the resident. I reviewed the residents note and agree with the documented findings and plan of care. Any areas of disagreement are noted on the chart. I was personally present for the key portions of any procedures. I have documented in the chart those procedures where I was not present during the key portions. I have reviewed the emergency nurses triage note. I agree with the chief complaint, past medical history, past surgical history, allergies, medications, social and family history as documented unless otherwise noted below. For Physician Assistant/ Nurse Practitioner cases/documentation I have personally evaluated this patient and have completed at least one if not all key elements of the E/M (history, physical exam, and MDM). Additional findings are as noted. Vital Signs: BP: (!) 136/99  Pulse: 105  Resp: 16  Temp: 98.4 °F (36.9 °C) SpO2: 99 %  PCP:  No primary care provider on file. Pertinent Comments:         Critical Care  None    This patient was evaluated in the Emergency Department for symptoms described in the history of present illness. He/she was evaluated in the context of the global COVID-19 pandemic, which necessitated consideration that the patient might be at risk for infection with the SARS-CoV-2 virus that causes COVID-19. Institutional protocols and algorithms that pertain to the evaluation of patients at risk for COVID-19 are in a state of rapid change based on information released by regulatory bodies including the CDC and federal and state organizations. These policies and algorithms were followed during the patient's care in the ED.     (Please note that portions of this note were completed with a voice recognition program. Efforts were made to edit the dictations but occasionally words are mis-transcribed.  Whenever words are used in this note in any gender, they shall be construed as though they were used in the gender appropriate to the circumstances; and whenever words are used in this note in the singular or plural form, they shall be construed as though they were used in the form appropriate to the circumstances.)    MD Lalo Morales  Attending Emergency Medicine Physician           César Buckner MD  06/13/21 [id number]

## 2021-06-14 LAB
CULTURE: NORMAL
Lab: NORMAL
SPECIMEN DESCRIPTION: NORMAL

## 2021-06-14 NOTE — ED NOTES
PT given and briefed over Discharge papers Dr. Charles Mccauley  PT had no further questions about stay or about Prescriptions/follow up Appointment. PT was able to ambulate to the Waiting Room without assistance or issues.        Barbie Martel, RN  06/13/21 [id number]

## 2021-06-14 NOTE — ED PROVIDER NOTES
[facility]  Emergency Department  Emergency Medicine Resident Sign-out     Care of Leila Singh was assumed from Dr. Carter Nunez and is being seen for Other (wants pregnancy test)   . The patient's initial evaluation and plan have been discussed with the prior provider who initially evaluated the patient. EMERGENCY DEPARTMENT COURSE / MEDICAL DECISION MAKING:       MEDICATIONS GIVEN:  Orders Placed This Encounter   Medications    Prenatal Vit-Fe Fumarate-FA (PRENATAL VITAMIN) 27-1 MG TABS tablet     Sig: Take 1 tablet by mouth daily     Dispense:  30 tablet     Refill:  0    cephALEXin (KEFLEX) 500 MG capsule     Sig: Take 1 capsule by mouth 2 times daily for 7 days     Dispense:  14 capsule     Refill:  0       LABS / RADIOLOGY:     Labs Reviewed   VAGINITIS DNA PROBE - Abnormal; Notable for the following components:       Result Value    Direct Exam POSITIVE for Gardnerella vaginalis.  (*)     All other components within normal limits   URINALYSIS WITH MICROSCOPIC - Abnormal; Notable for the following components:    Turbidity UA CLOUDY (*)     Ketones, Urine TRACE (*)     Leukocyte Esterase, Urine MODERATE (*)     Crystals, UA CALCIUM OXALATE (*)     Crystals, UA MODERATE (*)     Bacteria, UA MODERATE (*)     All other components within normal limits   PREGNANCY, URINE - Abnormal; Notable for the following components:    HCG(Urine) Pregnancy Test POSITIVE (*)     All other components within normal limits   COMPREHENSIVE METABOLIC PANEL - Abnormal; Notable for the following components:    Potassium 3.6 (*)     Total Bilirubin <0.10 (*)     All other components within normal limits   HCG, QUANTITATIVE, PREGNANCY - Abnormal; Notable for the following components:    hCG Quant 1,917 (*)     All other components within normal limits   CBC WITH AUTO DIFFERENTIAL - Abnormal; Notable for the following components:    Monocytes 9 (*)     Immature Granulocytes 1 (*)     All other components recommended.     Left ovary contains a round lesion with peripheral vascularity measuring 1.3  x 1.7 x 1.8 cm likely representing a hemorrhagic corpus luteal cyst.  OUTSTANDING TASKS / RECOMMENDATIONS:      1. Await labs and ultrasound     FINAL IMPRESSION:     1. Pregnancy, unspecified gestational age    3. Asymptomatic bacteriuria during pregnancy        DISPOSITION:       DISPOSITION:  [x]  Discharge   []  Transfer -    []  Admission -     []  Against Medical Advice   []  Eloped   FOLLOW-UP: No follow-up provider specified.    DISCHARGE MEDICATIONS: New Prescriptions    CEPHALEXIN (KEFLEX) 500 MG CAPSULE    Take 1 capsule by mouth 2 times daily for 7 days    PRENATAL VIT-FE FUMARATE-FA (PRENATAL VITAMIN) 27-1 MG TABS TABLET    Take 1 tablet by mouth daily          Brock Duval DO  Emergency Medicine Resident  [facility]     Brock Duval [place]  Resident  06/13/21 [id number]

## 2021-06-15 LAB
C TRACH DNA GENITAL QL NAA+PROBE: ABNORMAL
N. GONORRHOEAE DNA: NEGATIVE
SPECIMEN DESCRIPTION: ABNORMAL

## 2021-06-16 ENCOUNTER — TELEPHONE (OUTPATIENT)
Dept: PHARMACY | Age: 21
End: 2021-06-16

## 2021-06-16 DIAGNOSIS — A74.9 CHLAMYDIA: Primary | ICD-10-CM

## 2021-06-16 RX ORDER — AZITHROMYCIN 500 MG/1
1000 TABLET, FILM COATED ORAL ONCE
Qty: 2 TABLET | Refills: 0 | Status: SHIPPED | OUTPATIENT
Start: 2021-06-16 | End: 2021-06-16

## 2021-06-16 NOTE — TELEPHONE ENCOUNTER
CLINICAL PHARMACY NOTE:  Telephone Follow-up for Positive STD Test    At the time of Mila Mendoza's visit to [facility] Emergency Department on 6/13/2021 STD testing was performed. DNA testing was positive for Chlamydia. Spoke to patient on 6/16/2021 to review test results and regarding need to take oral antibiotic therapy. Instructed patient to abstain from sexual intercourse until receiving the antibiotic and then for 7 days after treatment. Patient also advised to inform any partners that they will need to be tested as well. Patient verbally expressed understanding of above plan. Per protocol, prescription for azithromycin 1Gm orally x 1 dose sent to [address] [place] [address] on behalf of Dr. Giovanna Joya.       Hali hSafer, RP, CACP  Clinical Pharmacist Medication Management  6/16/2021  1:24 PM      For Pharmacy Admin Tracking Only     Intervention Detail: Adherence Monitoring: STD F/U   Total # of Interventions Recommended: 1   Total # of Interventions Accepted: 1   Time Spent (min): 15

## 2021-06-16 NOTE — TELEPHONE ENCOUNTER
Attempt made to reach patient by telephone to review results of STD testing. Left voice message for return call to [phone].

## 2021-07-13 ENCOUNTER — HOSPITAL ENCOUNTER (EMERGENCY)
Age: 21
Discharge: HOME | End: 2021-07-13
Attending: EMERGENCY MEDICINE
Payer: COMMERCIAL

## 2021-07-13 VITALS
BODY MASS INDEX: 38.27 KG/M2 | RESPIRATION RATE: 18 BRPM | DIASTOLIC BLOOD PRESSURE: 94 MMHG | OXYGEN SATURATION: 98 % | HEART RATE: 96 BPM | SYSTOLIC BLOOD PRESSURE: 134 MMHG | HEIGHT: 65 IN | TEMPERATURE: 99.3 F

## 2021-07-13 DIAGNOSIS — Z34.90 INTRAUTERINE PREGNANCY: Primary | ICD-10-CM

## 2021-07-13 PROCEDURE — 99282 EMERGENCY DEPT VISIT SF MDM: CPT

## 2021-07-13 ASSESSMENT — ENCOUNTER SYMPTOMS
NAUSEA: 0
CONSTIPATION: 0
VOMITING: 0
RHINORRHEA: 0
DIARRHEA: 0
SORE THROAT: 0
ABDOMINAL PAIN: 0
ABDOMINAL DISTENTION: 0

## 2021-07-13 NOTE — LETTER
[facility] ED  [address] [facility]  Phone: [phone]             July 13, 2021    Patient: Trenna Shone   YOB: 2000   Date of Visit: 7/13/2021       To Whom It May Concern: Ashley Meyers was seen and treated in our emergency department on 7/13/2021.  She had her pregnancy confirmed       Sincerely,             Signature:__________________________________

## 2021-07-13 NOTE — ED PROVIDER NOTES
[address]  ED  Emergency Department Encounter  EmergencyMedicine Resident     Pt Jaimie Barraza  MRN: [id number]  Maddigfmichelle 2000  Date of evaluation: 7/13/21  PCP:  No primary care provider on file. This patient was evaluated in the Emergency Department for symptoms described in the history of present illness. The patient was evaluated in the context of the global COVID-19 pandemic, which necessitated consideration that the patient might be at risk for infection with the SARS-CoV-2 virus that causes COVID-19. Institutional protocols and algorithms that pertain to the evaluation of patients at risk for COVID-19 are in a state of rapid change based on information released by regulatory bodies including the CDC and federal and state organizations. These policies and algorithms were followed during the patient's care in the ED. CHIEF COMPLAINT       Chief Complaint   Patient presents with    Routine Prenatal Visit     needs pregnncy test, confirmation       HISTORY OF PRESENT ILLNESS  (Location/Symptom, Timing/Onset, Context/Setting, Quality, Duration, Modifying Factors, Severity.)      Park Salas is a 24 y.o. female who presents to the ED for a pregnancy test. The patient presented last month for the same reason and a urine pregnancy test was positive then. The patient has no abdominal pain, vomiting, back pain, headache. No history of diarrhea, constipation, heat or cold intolerance, no excessive sweating. LMP was in May 13th. PAST MEDICAL / SURGICAL / SOCIAL / FAMILY HISTORY      has no past medical history on file. has no past surgical history on file.       Social History     Socioeconomic History    Marital status: Single     Spouse name: Not on file    Number of children: Not on file    Years of education: Not on file    Highest education level: Not on file   Occupational History    Not on file   Tobacco Use    Smoking status: Never Smoker    dizziness, syncope, light-headedness and headaches. PHYSICAL EXAM   (up to 7 for level 4, 8 or more for level 5)      INITIAL VITALS:   BP (!) 134/94   Pulse 96   Temp 99.3 °F (37.4 °C) (Oral)   Resp 18   Ht 5' 5\" (1.651 m)   LMP 05/20/2021   SpO2 98%   BMI 38.27 kg/m²     Physical Exam  Constitutional:       General: She is not in acute distress. Appearance: Normal appearance. Cardiovascular:      Rate and Rhythm: Normal rate and regular rhythm. Pulses: Normal pulses. Heart sounds: Normal heart sounds. No murmur heard. No friction rub. No gallop. Pulmonary:      Effort: Pulmonary effort is normal. No respiratory distress. Breath sounds: Normal breath sounds. No wheezing, rhonchi or rales. Abdominal:      General: Abdomen is flat. Bowel sounds are normal. There is no distension. Palpations: Abdomen is soft. Tenderness: There is no abdominal tenderness. Skin:     Capillary Refill: Capillary refill takes less than 2 seconds. Coloration: Skin is not jaundiced or pale. Findings: No rash. Neurological:      Mental Status: She is alert. DIFFERENTIAL  DIAGNOSIS     PLAN (LABS / IMAGING / EKG):  No orders of the defined types were placed in this encounter. MEDICATIONS ORDERED:  No orders of the defined types were placed in this encounter. DDX: Pregnancy   Ectopic pregnancy      DIAGNOSTIC RESULTS / EMERGENCY DEPARTMENT COURSE / MDM   LAB RESULTS:  No results found for this visit on 07/13/21. IMPRESSION: Patient has an intrauterine pregnancy and need a follow up with an obstetrician. RADIOLOGY:  Bedside ultrasound that showed an intrauterine pregnancy    EKG  None    All EKG's are interpreted by the Emergency Department Physician who either signs or Co-signs this chart in the absence of a cardiologist.    EMERGENCY DEPARTMENT COURSE:  Patient presented to the ED for a pregnancy test. LMP was in May13th.  No abdominal pain, nausea or vomiting, no diarrhea, or constipation, no heat or cold intolerance. Physical exam is unremarkable. Bedside ultrasound showed a viable fetus with HR of 162bpm      PROCEDURES:  None    CONSULTS:  None    CRITICAL CARE:  None    FINAL IMPRESSION      1.  Intrauterine pregnancy           DISPOSITION / PLAN     DISPOSITION Decision To Discharge 07/13/2021 02:53:36 PM      PATIENT REFERRED TO:  [address] [address] [phone]  Call   For follow up    [facility] ED  [address] [facility]  [phone]  Go to   As needed, If symptoms worsen    [address] [place] [address]  [phone]  Today  To establish an OB/GYN and follow up the pregnancy      DISCHARGE MEDICATIONS:  Discharge Medication List as of 7/13/2021  3:23 PM          Sharon Yo MD  Emergency Medicine Resident    (Please note that portions of thisnote were completed with a voice recognition program.  Efforts were made to edit the dictations but occasionally words are mis-transcribed.)     Sharon Yo MD  Resident  07/13/21 1534       Sharon Yo MD  Resident  07/17/21 Yuki Burrell MD  Resident  07/17/21 5867       Sharon Yo MD  Resident  07/17/21 6174

## 2021-07-13 NOTE — ED PROVIDER NOTES
[facility]     Emergency Department     Faculty Note/ Attestation      Pt Name: Trenna Shone                                       MRN: [id number]  [place] 2000  Date of evaluation: 7/13/2021    Patients PCP:    No primary care provider on file. Attestation  I performed a history and physical examination of the patient and discussed management with the resident. I reviewed the residents note and agree with the documented findings and plan of care. Any areas of disagreement are noted on the chart. I was personally present for the key portions of any procedures. I have documented in the chart those procedures where I was not present during the key portions. I have reviewed the emergency nurses triage note. I agree with the chief complaint, past medical history, past surgical history, allergies, medications, social and family history as documented unless otherwise noted below. For Physician Assistant/ Nurse Practitioner cases/documentation I have personally evaluated this patient and have completed at least one if not all key elements of the E/M (history, physical exam, and MDM). Additional findings are as noted.       Initial Screens:             Vitals:    Vitals:    07/13/21 1241   BP: (!) 134/94   Pulse: 96   Resp: 18   Temp: 99.3 °F (37.4 °C)   TempSrc: Oral   SpO2: 98%   Height: 5' 5\" (1.651 m)       CHIEF COMPLAINT       Chief Complaint   Patient presents with    Routine Prenatal Visit     needs pregnncy test, confirmation             DIAGNOSTIC RESULTS             RADIOLOGY:   No orders to display         LABS:  Labs Reviewed - No data to display      EMERGENCY DEPARTMENT COURSE:     -------------------------  BP: (!) 134/94, Temp: 99.3 °F (37.4 °C), Pulse: 96, Resp: 18      Comments    Pos preg test 1 month ago  Now here asking for another  LMP May 13th  No abd pain or other sx    Will offer transabd US, on PNV, will refer to ob outpt    (Please note that portions of this note were completed with a voice recognition program.  Efforts were made to edit the dictations but occasionally words are mis-transcribed.)      Deanna Noe MD,, MD  Attending Emergency Physician         Deanna Noe MD  07/13/21 [phone]

## 2021-07-13 NOTE — ED NOTES
Pt to ED for proof of pregnancy.  Pt denies any complaints at this time      Lora Castillo, HARI  07/13/21 3256

## 2021-08-20 ENCOUNTER — ANCILLARY PROCEDURE (OUTPATIENT)
Dept: OBGYN | Age: 21
End: 2021-08-20
Payer: COMMERCIAL

## 2021-08-20 DIAGNOSIS — Z36.89 ESTABLISH GESTATIONAL AGE, ULTRASOUND: ICD-10-CM

## 2021-08-20 PROCEDURE — 76805 OB US >/= 14 WKS SNGL FETUS: CPT | Performed by: RADIOLOGY

## 2021-09-08 ENCOUNTER — FOLLOWUP TELEPHONE ENCOUNTER (OUTPATIENT)
Dept: OBGYN | Age: 21
End: 2021-09-08

## 2021-09-08 NOTE — TELEPHONE ENCOUNTER
SW attempted to contact Pt for intake assessment and depression screen. Pt did not answer calls. SW left voicemails with appt scheduled time and call back information.

## 2021-10-11 ENCOUNTER — FOLLOWUP TELEPHONE ENCOUNTER (OUTPATIENT)
Dept: OBGYN | Age: 21
End: 2021-10-11

## 2021-10-11 ENCOUNTER — VIRTUAL VISIT (OUTPATIENT)
Dept: OBGYN | Age: 21
End: 2021-10-11
Payer: COMMERCIAL

## 2021-10-11 VITALS — WEIGHT: 228 LBS | BODY MASS INDEX: 37.94 KG/M2

## 2021-10-11 DIAGNOSIS — O09.32 NO PRENATAL CARE IN CURRENT PREGNANCY IN SECOND TRIMESTER: ICD-10-CM

## 2021-10-11 DIAGNOSIS — O09.92 HIGH-RISK PREGNANCY, SECOND TRIMESTER: ICD-10-CM

## 2021-10-11 PROBLEM — E66.9 OBESITY: Status: ACTIVE | Noted: 2021-10-11

## 2021-10-11 PROCEDURE — G8427 DOCREV CUR MEDS BY ELIG CLIN: HCPCS | Performed by: OBSTETRICS & GYNECOLOGY

## 2021-10-11 PROCEDURE — 99443 PR PHYS/QHP TELEPHONE EVALUATION 21-30 MIN: CPT | Performed by: OBSTETRICS & GYNECOLOGY

## 2021-10-11 PROCEDURE — G8417 CALC BMI ABV UP PARAM F/U: HCPCS | Performed by: OBSTETRICS & GYNECOLOGY

## 2021-10-11 PROCEDURE — 99211 OFF/OP EST MAY X REQ PHY/QHP: CPT | Performed by: OBSTETRICS & GYNECOLOGY

## 2021-10-11 RX ORDER — PRENATAL WITH FERROUS FUM AND FOLIC ACID 3080; 920; 120; 400; 22; 1.84; 3; 20; 10; 1; 12; 200; 27; 25; 2 [IU]/1; [IU]/1; MG/1; [IU]/1; MG/1; MG/1; MG/1; MG/1; MG/1; MG/1; UG/1; MG/1; MG/1; MG/1; MG/1
1 TABLET ORAL DAILY
Qty: 30 TABLET | Refills: 5 | Status: SHIPPED | OUTPATIENT
Start: 2021-10-11

## 2021-10-11 ASSESSMENT — PATIENT HEALTH QUESTIONNAIRE - PHQ9
SUM OF ALL RESPONSES TO PHQ9 QUESTIONS 1 & 2: 0
2. FEELING DOWN, DEPRESSED OR HOPELESS: 0
1. LITTLE INTEREST OR PLEASURE IN DOING THINGS: 0
SUM OF ALL RESPONSES TO PHQ QUESTIONS 1-9: 0

## 2021-10-11 NOTE — TELEPHONE ENCOUNTER
SW spoke with Pt for depression screen and [facility] initial assessment. Pt reported having a good support system, needing some help with baby items. Reports cessation of tobacco, alcohol and thc. SW completed lead screen with Pt. Pt scored 0 on PHQ-2. SW educated Pt on safe sleep, infant mortality, smoking cessation. No issues or concerns with MH symptoms, no depression or anxiety. No issues to discuss with SW at this time. Pt informed she will speak with Kash Mccracken RN to complete the intake. SW will follow up at 28 weeks and as needed. Lead screening for pregnant and breast-feeding women. 1. Do you live in or regularly visit a home built before 1978 that has had renovations, repair work, or remodeling in the past 12 months? No  2. Have you resided in or emigrated from areas were  contamination is high ([facility], [facility], [place])? No  3. Do you live near a manufacturing plant where lead is used e.g. battery manufacturing or recycling, ship building, or plastic manufacturing? No  4. Do you work with lead or live with someone who does? No  5. Do you cook with, store or serve food in [facility]? No  6. Do you eat none food substances that may be contaminated with lead such as soil or lead glazed ceramic pottery? No  7. Do you use alternative therapies, herbs or home remedies imported from [facility], [facility], [place], [place] or  countries? No  8. Do you use imported traditional cosmetics such as adelina or surma that may be contaminated with lead? No  9. Do you or a family member engage in hobbies where lead is used e.g. stained glass or making pottery with lead glaze? No  10. Has your home been identified as having lead pipes or water source lines with lead? No  11. Have you ever been told that you have high levels of lead in your body? No  12. Do you live with someone identified as having elevated lead levels, child, close friend or relative?  No      Patients

## 2021-10-11 NOTE — PROGRESS NOTES
Wayne Benavides is a 22 y.o. female evaluated via telephone on 10/11/2021. Consent:  She and/or health care decision maker is aware that that she may receive a bill for this telephone service, depending on her insurance coverage, and has provided verbal consent to proceed: Yes      Documentation:  I communicated with the patient and/or health care decision maker about the initial prenatal assessment. Details of this discussion including any medical advice provided: see notes      I affirm this is a Patient Initiated Episode with a Patient who has not had a related appointment within my department in the past 7 days or scheduled within the next 24 hours. Patient identification was verified at the start of the visit: Yes    Total Time: minutes: 21-30 minutes    The visit was conducted pursuant to the emergency declaration under the [address] [place] [address], [address] [place] [address] authority and the [employer] General Act. Patient identification was verified, and a caregiver was present when appropriate. The patient was located in a state where the provider was credentialed to provide care. Note: not billable if this call serves to triage the patient into an appointment for the relevant concern        Julio Lowe RN   [facility]    First Trimester Plans/Education completed per ACOG Guidelines. Pt counseled and verbalizes understanding. Routine Prenatal Tests,  Risk Factors Identified By Prenatal History, Anticipated Course of Prenatal Care, Nutrition and Weight Gain Counseling, Toxoplasmosis Precautions ( cats/raw meat), Sexual Activity, Exercise, Influenza/Tdap Vaccine, Smoking Counseling, Environmental/Work Hazards, Travel, Alcohol, Illicit/Recreational Drugs, Use Of Any Medications, Indications for Ultrasound, Domestic Violence, Seat Belt Use, Dental Care , Childbirth Classes/Hospital Facilities.    Second Trimester Plan/Education Completed Per ACOG Guidelines. Pt counseled and verbalizes understanding. Signs and Symptoms of  Labor, Influenza/Tdap Vaccine,Smoking Counseling, Domestic Violence. Risk Factors- No prenatal care in sec trimester, obesity   No pap due to age. Cultures at next visit  1hr gtt lab ordered for early diabetic screening  10/11/21- MFM referral placed for anatomy scan, NIPT. MSAFP ordered   Pt agreeable to vaccinations in preg. Agreeable to COVID vaccine- Plans to receive     Ob education/Intake completed today.   Pt occupation-  none      Prim Care Provider- none  Recent ER visits-    no     Planned/Unplanned Pregnancy-unplanned  Father of Baby-  involved             Pt lives with- her partner   LMP-     approx          Menses Monthly-  yes        BCP at [employer]-no  Dating Ultrasound- completed   Section History/Vaginal Delivery History- n/a  History of Spontaneous  Delivery-n/a  Varicella History- hx of vaccine  Blood Transfusion Acceptable-yes  Last Pap-    N/a due to age             Report Available- n/a  First Trimester Screen/MSAFP/Quad- AFP ordered  Initial Prenatal Labs given to pt today- entered into epic  Smoker-no  BMI- rec wt gain 11-20#  Substance Abuse History-no  Depression/Anxiety History-no  Domestic Abuse History-no  Dental Care- education provided   Reviewed how to reach Ob/Gyn Resident afterhours-  Pt verb understanding

## 2021-10-13 ENCOUNTER — INITIAL PRENATAL (OUTPATIENT)
Dept: OBGYN | Age: 21
End: 2021-10-13
Payer: COMMERCIAL

## 2021-10-13 ENCOUNTER — HOSPITAL ENCOUNTER (OUTPATIENT)
Age: 21
Discharge: HOME | End: 2021-10-13
Payer: COMMERCIAL

## 2021-10-13 ENCOUNTER — HOSPITAL ENCOUNTER (OUTPATIENT)
Age: 21
Setting detail: SPECIMEN
Discharge: HOME | End: 2021-10-13
Payer: COMMERCIAL

## 2021-10-13 VITALS
SYSTOLIC BLOOD PRESSURE: 125 MMHG | WEIGHT: 228 LBS | HEART RATE: 81 BPM | DIASTOLIC BLOOD PRESSURE: 80 MMHG | BODY MASS INDEX: 37.94 KG/M2

## 2021-10-13 DIAGNOSIS — O09.92 HIGH-RISK PREGNANCY, SECOND TRIMESTER: ICD-10-CM

## 2021-10-13 DIAGNOSIS — O09.92 HIGH-RISK PREGNANCY IN SECOND TRIMESTER: Primary | ICD-10-CM

## 2021-10-13 DIAGNOSIS — O09.92 HIGH-RISK PREGNANCY IN SECOND TRIMESTER: ICD-10-CM

## 2021-10-13 DIAGNOSIS — I10 CHRONIC HYPERTENSION: ICD-10-CM

## 2021-10-13 DIAGNOSIS — A74.9 CHLAMYDIA INFECTION AFFECTING PREGNANCY IN FIRST TRIMESTER: ICD-10-CM

## 2021-10-13 DIAGNOSIS — O98.811 CHLAMYDIA INFECTION AFFECTING PREGNANCY IN FIRST TRIMESTER: ICD-10-CM

## 2021-10-13 PROBLEM — O98.819 CHLAMYDIA INFECTION AFFECTING PREGNANCY: Status: ACTIVE | Noted: 2021-10-13

## 2021-10-13 LAB
CREATININE URINE: 107.2 MG/DL (ref 28–217)
GLUCOSE ADMINISTRATION: NORMAL
GLUCOSE TOLERANCE SCREEN 50G: 101 MG/DL (ref 70–135)
TOTAL PROTEIN, URINE: 14 MG/DL
URINE TOTAL PROTEIN CREATININE RATIO: 0.13 (ref 0–0.2)

## 2021-10-13 PROCEDURE — 36415 COLL VENOUS BLD VENIPUNCTURE: CPT

## 2021-10-13 PROCEDURE — 82950 GLUCOSE TEST: CPT

## 2021-10-13 PROCEDURE — 1036F TOBACCO NON-USER: CPT | Performed by: STUDENT IN AN ORGANIZED HEALTH CARE EDUCATION/TRAINING PROGRAM

## 2021-10-13 PROCEDURE — G8417 CALC BMI ABV UP PARAM F/U: HCPCS | Performed by: STUDENT IN AN ORGANIZED HEALTH CARE EDUCATION/TRAINING PROGRAM

## 2021-10-13 PROCEDURE — G8482 FLU IMMUNIZE ORDER/ADMIN: HCPCS | Performed by: STUDENT IN AN ORGANIZED HEALTH CARE EDUCATION/TRAINING PROGRAM

## 2021-10-13 PROCEDURE — G0008 ADMIN INFLUENZA VIRUS VAC: HCPCS | Performed by: OBSTETRICS & GYNECOLOGY

## 2021-10-13 PROCEDURE — 99213 OFFICE O/P EST LOW 20 MIN: CPT | Performed by: STUDENT IN AN ORGANIZED HEALTH CARE EDUCATION/TRAINING PROGRAM

## 2021-10-13 PROCEDURE — 96372 THER/PROPH/DIAG INJ SC/IM: CPT | Performed by: STUDENT IN AN ORGANIZED HEALTH CARE EDUCATION/TRAINING PROGRAM

## 2021-10-13 PROCEDURE — 99211 OFF/OP EST MAY X REQ PHY/QHP: CPT | Performed by: STUDENT IN AN ORGANIZED HEALTH CARE EDUCATION/TRAINING PROGRAM

## 2021-10-13 PROCEDURE — G8427 DOCREV CUR MEDS BY ELIG CLIN: HCPCS | Performed by: STUDENT IN AN ORGANIZED HEALTH CARE EDUCATION/TRAINING PROGRAM

## 2021-10-13 RX ORDER — ASPIRIN 81 MG/1
81 TABLET ORAL DAILY
Qty: 90 TABLET | Refills: 1 | Status: ON HOLD | OUTPATIENT
Start: 2021-10-13 | End: 2022-01-28 | Stop reason: HOSPADM

## 2021-10-13 NOTE — PROGRESS NOTES
[facility] OB/GYN  Initial Prenatal Visit    CC: Initial Prenatal Visit    HPI:   Maryanne Narvaez is a 22 y.o. female  at 23w1d  She is being seen today for her first obstetrical visit. Pregnancy history fully reviewed. This is not a planned pregnancy. Her LMP is Patient's last menstrual period was 2021 (approximate). Her obstetrical history is significant for nulliparity, obesity, newly dx cHTN. The patient was seen and evaluated. The patient denies complaints. There was positive fetal movements. She denies contractions, vaginal bleeding and leakage of fluid. She currently denies any signs or symptoms of pre-eclampsia which include headache, vision changes, RUQ pain. The patient requested the T-Dap Vaccine (27-36 weeks) this pregnancy. The patient is Rh pos and Rhogam is not indicated in this pregnancy     The patient requested the influenza vaccine this year. The patient requested the COVID-19 vaccine this year. Relationship with FOB: boyfriend, involved  Mother's ethnicity:   Father's ethnicity:   Family History:    - Neural tube defects: No   - Congenital birth defects (congenital heart defects, polydactyly, cleft lip/palate): No   - Intellectual disability: No   - Genetic disorders/chromosomal abnormalities: No   - Diabetes mellitus in first degree relatives: No  Genetic screening was discussed and patient agreeable. OB History:  OB History    Para Term  AB Living   1 0 0 0 0 0   SAB TAB Ectopic Molar Multiple Live Births   0 0 0 0 0 0      # Outcome Date GA Lbr Andrew/2nd Weight Sex Delivery Anes PTL Lv   1 Current                Past Medical History:  Past Medical History:   Diagnosis Date    Chronic HTN 10/13/2021    Obese        Past Surgical History:  No past surgical history on file.      Medications:  Current Outpatient Medications on File Prior to Visit   Medication Sig Dispense Refill    Prenatal Vit-Fe Fumarate-FA (PRENATAL VITAMIN) 27-1 MG TABS tablet Take 1 tablet by mouth daily May substitute with any prenatal vit pt insurance will cover 30 tablet 5     No current facility-administered medications on file prior to visit. Allergies: Allergies as of 10/13/2021    (No Known Allergies)       Social History:  Social History     Socioeconomic History    Marital status: Single     Spouse name: Not on file    Number of children: Not on file    Years of education: Not on file    Highest education level: Not on file   Occupational History    Not on file   Tobacco Use    Smoking status: Never Smoker    Smokeless tobacco: Never Used   Vaping Use    Vaping Use: Never used   Substance and Sexual Activity    Alcohol use: Yes     Comment: rare when not preg    Drug use: Never    Sexual activity: Yes     Partners: Male   Other Topics Concern    Not on file   Social History Narrative    Not on file     Social Determinants of Health     Financial Resource Strain:     Difficulty of Paying Living Expenses:    Food Insecurity:     Worried About Running Out of Food in the Last Year:     [address] in the Last Year:    Transportation Needs:     Lack of Transportation (Medical):      Lack of Transportation (Non-Medical):    Physical Activity:     Days of Exercise per Week:     Minutes of Exercise per Session:    Stress:     Feeling of Stress :    Social Connections:     Frequency of Communication with Friends and Family:     Frequency of Social Gatherings with Friends and Family:     Attends Judaism Services:     Active Member of Clubs or Organizations:     Attends Club or Organization Meetings:     Marital Status:    Intimate Partner Violence:     Fear of Current or Ex-Partner:     Emotionally Abused:     Physically Abused:     Sexually Abused:        Family History:  Family History   Problem Relation Age of Onset    No Known Problems Paternal Grandfather     Diabetes Paternal Grandmother     No Known Problems Maternal Grandmother     No Known Problems Maternal Grandfather     No Known Problems Father     No Known Problems Mother     No Known Problems Brother     No Known Problems Sister        Vitals:  Vitals:    10/13/21 1422   BP: 125/80   Pulse: 81   Weight: 228 lb (103.4 kg)           Physical Exam: Completed, See Epic Navigator   Chaperone for Intimate Exam: Chaperone was present for entire exam, Chaperone Name: Radha Bose MA       Assessment & Plan: Bailey Quinteros is a 22 y.o. female  at 21w4d Initial Obstetrical Visit   - The patient was seen full history and physical was completed/reviewed. - Prenatal labs previously ordered   - Prenatal vitamins prescription PRN   - Aspirin indication: indicated due to High risk factors: chronic hypertension, Moderate risk factors: nulliparity and BMI >30- Rx given   - Problem list reviewed and updated   - NT Screen/Quad Testing and MSAFP Single Marker: requested, lab ordered   - Role of ultrasound in pregnancy discussed; requests fetal survey, MFM referral ordered. Appt scheduled 11/15   - Gc/Chlam Cultures & Vaginitis: collected today. Positive for Chlamydia 21, TOR needed   - Last pap smear: N/A due to age   - Tdap vaccination: discussed recommendations for TDAP immunization, patient requested. - Influenza vaccination: requested; given today   - Rhogam: not indicated   - COVID-19 vaccination: R/B/A discussed with increased risk of both maternal and fetal morbidity and mortality in unvaccinated pregnant patients who contract COVID-19- patient is requesting today. Encouraged patient to make appt at [facility] for vaccine. cHTN (no meds; new dx)   - BP normotensive   - Denies any s/s of preE   - Baseline PreE labs wnl 21 in ED   - Baseline P/C ordered   - Rx for ASA given     Upon completion of the visit all questions were answered and the patients follow-up and testing schedule were reviewed.      Patient Active Problem List    Diagnosis Date Noted   

## 2021-10-14 LAB
C TRACH DNA GENITAL QL NAA+PROBE: NEGATIVE
DIRECT EXAM: NORMAL
Lab: NORMAL
N. GONORRHOEAE DNA: NEGATIVE
SPECIMEN DESCRIPTION: NORMAL
SPECIMEN DESCRIPTION: NORMAL

## 2021-11-10 ENCOUNTER — TELEPHONE (OUTPATIENT)
Dept: OBGYN | Age: 21
End: 2021-11-10

## 2021-11-15 ENCOUNTER — HOSPITAL ENCOUNTER (OUTPATIENT)
Age: 21
Discharge: HOME | End: 2021-11-15
Payer: COMMERCIAL

## 2021-11-15 ENCOUNTER — ROUTINE PRENATAL (OUTPATIENT)
Dept: PERINATAL CARE | Age: 21
End: 2021-11-15
Payer: COMMERCIAL

## 2021-11-15 VITALS
RESPIRATION RATE: 16 BRPM | SYSTOLIC BLOOD PRESSURE: 119 MMHG | HEART RATE: 88 BPM | TEMPERATURE: 97 F | HEIGHT: 65 IN | DIASTOLIC BLOOD PRESSURE: 84 MMHG | WEIGHT: 223 LBS | BODY MASS INDEX: 37.15 KG/M2

## 2021-11-15 DIAGNOSIS — O99.213 OBESITY AFFECTING PREGNANCY IN THIRD TRIMESTER: ICD-10-CM

## 2021-11-15 DIAGNOSIS — O16.3 HYPERTENSION AFFECTING PREGNANCY IN THIRD TRIMESTER: ICD-10-CM

## 2021-11-15 DIAGNOSIS — O09.33 INSUFFICIENT PRENATAL CARE IN THIRD TRIMESTER: ICD-10-CM

## 2021-11-15 DIAGNOSIS — Z3A.27 27 WEEKS GESTATION OF PREGNANCY: ICD-10-CM

## 2021-11-15 DIAGNOSIS — O36.5990 INTRAUTERINE GROWTH RESTRICTION AFFECTING ANTEPARTUM CARE OF MOTHER, SINGLE OR UNSPECIFIED FETUS: Primary | ICD-10-CM

## 2021-11-15 PROCEDURE — 76820 UMBILICAL ARTERY ECHO: CPT | Performed by: OBSTETRICS & GYNECOLOGY

## 2021-11-15 PROCEDURE — G8427 DOCREV CUR MEDS BY ELIG CLIN: HCPCS | Performed by: OBSTETRICS & GYNECOLOGY

## 2021-11-15 PROCEDURE — 36415 COLL VENOUS BLD VENIPUNCTURE: CPT

## 2021-11-15 PROCEDURE — 76819 FETAL BIOPHYS PROFIL W/O NST: CPT | Performed by: OBSTETRICS & GYNECOLOGY

## 2021-11-15 PROCEDURE — 76811 OB US DETAILED SNGL FETUS: CPT | Performed by: OBSTETRICS & GYNECOLOGY

## 2021-11-15 PROCEDURE — G8417 CALC BMI ABV UP PARAM F/U: HCPCS | Performed by: OBSTETRICS & GYNECOLOGY

## 2021-11-15 PROCEDURE — G8482 FLU IMMUNIZE ORDER/ADMIN: HCPCS | Performed by: OBSTETRICS & GYNECOLOGY

## 2021-11-15 PROCEDURE — 99244 OFF/OP CNSLTJ NEW/EST MOD 40: CPT | Performed by: OBSTETRICS & GYNECOLOGY

## 2021-11-15 PROCEDURE — 76821 MIDDLE CEREBRAL ARTERY ECHO: CPT | Performed by: OBSTETRICS & GYNECOLOGY

## 2021-11-16 DIAGNOSIS — O16.3 HYPERTENSION AFFECTING PREGNANCY IN THIRD TRIMESTER: Primary | ICD-10-CM

## 2021-11-16 DIAGNOSIS — O36.5990 IUGR, ANTENATAL: ICD-10-CM

## 2021-11-16 LAB
SEND OUT REPORT: NORMAL
TEST NAME: NORMAL

## 2021-11-22 ENCOUNTER — ROUTINE PRENATAL (OUTPATIENT)
Dept: OBGYN | Age: 21
End: 2021-11-22
Payer: COMMERCIAL

## 2021-11-22 ENCOUNTER — HOSPITAL ENCOUNTER (OUTPATIENT)
Age: 21
Setting detail: SPECIMEN
Discharge: HOME | End: 2021-11-22

## 2021-11-22 VITALS
BODY MASS INDEX: 37.34 KG/M2 | DIASTOLIC BLOOD PRESSURE: 78 MMHG | SYSTOLIC BLOOD PRESSURE: 124 MMHG | WEIGHT: 224.4 LBS | HEART RATE: 90 BPM

## 2021-11-22 DIAGNOSIS — Z3A.28 28 WEEKS GESTATION OF PREGNANCY: ICD-10-CM

## 2021-11-22 DIAGNOSIS — O36.5990 PREGNANCY AFFECTED BY FETAL GROWTH RESTRICTION: Primary | ICD-10-CM

## 2021-11-22 DIAGNOSIS — O09.92 HIGH-RISK PREGNANCY, SECOND TRIMESTER: ICD-10-CM

## 2021-11-22 LAB
-: ABNORMAL
ABO/RH: NORMAL
ABSOLUTE EOS #: 0.34 K/UL (ref 0–0.44)
ABSOLUTE IMMATURE GRANULOCYTE: 0.1 K/UL (ref 0–0.3)
ABSOLUTE LYMPH #: 2.46 K/UL (ref 1.2–5.2)
ABSOLUTE MONO #: 1.08 K/UL (ref 0.1–1.4)
AMORPHOUS: ABNORMAL
AMPHETAMINE SCREEN URINE: NEGATIVE
ANTIBODY SCREEN: NEGATIVE
BACTERIA: ABNORMAL
BARBITURATE SCREEN URINE: NEGATIVE
BASOPHILS # BLD: 0 % (ref 0–2)
BASOPHILS ABSOLUTE: 0.04 K/UL (ref 0–0.2)
BENZODIAZEPINE SCREEN, URINE: NEGATIVE
BILIRUBIN URINE: NEGATIVE
BUPRENORPHINE URINE: NORMAL
CANNABINOID SCREEN URINE: NEGATIVE
CASTS UA: ABNORMAL /LPF (ref 0–2)
COCAINE METABOLITE, URINE: NEGATIVE
COLOR: YELLOW
CRYSTALS, UA: ABNORMAL /HPF
CRYSTALS, UA: ABNORMAL /HPF
DIFFERENTIAL TYPE: ABNORMAL
EOSINOPHILS RELATIVE PERCENT: 2 % (ref 1–4)
EPITHELIAL CELLS UA: ABNORMAL /HPF (ref 0–5)
GLUCOSE ADMINISTRATION: ABNORMAL
GLUCOSE TOLERANCE SCREEN 50G: 154 MG/DL (ref 70–135)
GLUCOSE URINE: NEGATIVE
HCT VFR BLD CALC: 35 % (ref 36.3–47.1)
HEMOGLOBIN: 11.7 G/DL (ref 11.9–15.1)
HEPATITIS B SURFACE ANTIGEN: NONREACTIVE
HEPATITIS C ANTIBODY: NONREACTIVE
HIV AG/AB: NONREACTIVE
IMMATURE GRANULOCYTES: 1 %
KETONES, URINE: ABNORMAL
LEUKOCYTE ESTERASE, URINE: ABNORMAL
LYMPHOCYTES # BLD: 17 % (ref 25–45)
MCH RBC QN AUTO: 31 PG (ref 25.2–33.5)
MCHC RBC AUTO-ENTMCNC: 33.4 G/DL (ref 28.4–34.8)
MCV RBC AUTO: 92.8 FL (ref 82.6–102.9)
MDMA URINE: NORMAL
METHADONE SCREEN, URINE: NEGATIVE
METHAMPHETAMINE, URINE: NORMAL
MONOCYTES # BLD: 7 % (ref 2–8)
MUCUS: ABNORMAL
NITRITE, URINE: NEGATIVE
NRBC AUTOMATED: 0 PER 100 WBC
OPIATES, URINE: NEGATIVE
OTHER OBSERVATIONS UA: ABNORMAL
OXYCODONE SCREEN URINE: NEGATIVE
PDW BLD-RTO: 13.5 % (ref 11.8–14.4)
PH UA: 6.5 (ref 5–8)
PHENCYCLIDINE, URINE: NEGATIVE
PLATELET # BLD: 279 K/UL (ref 138–453)
PLATELET ESTIMATE: ABNORMAL
PMV BLD AUTO: 11.7 FL (ref 8.1–13.5)
PROPOXYPHENE, URINE: NORMAL
PROTEIN UA: ABNORMAL
RBC # BLD: 3.77 M/UL (ref 3.95–5.11)
RBC # BLD: ABNORMAL 10*6/UL
RBC UA: ABNORMAL /HPF (ref 0–2)
RENAL EPITHELIAL, UA: ABNORMAL /HPF
RUBV IGG SER QL: 180.7 IU/ML
SEG NEUTROPHILS: 73 % (ref 34–64)
SEGMENTED NEUTROPHILS ABSOLUTE COUNT: 10.59 K/UL (ref 1.8–8)
SPECIFIC GRAVITY UA: 1.02 (ref 1–1.03)
T. PALLIDUM, IGG: NONREACTIVE
TEST INFORMATION: NORMAL
TRICHOMONAS: ABNORMAL
TRICYCLIC ANTIDEPRESSANTS, UR: NORMAL
TURBIDITY: CLEAR
URINE HGB: NEGATIVE
UROBILINOGEN, URINE: NORMAL
WBC # BLD: 14.6 K/UL (ref 4.5–13.5)
WBC # BLD: ABNORMAL 10*3/UL
WBC UA: ABNORMAL /HPF (ref 0–5)
YEAST: ABNORMAL

## 2021-11-22 PROCEDURE — 99213 OFFICE O/P EST LOW 20 MIN: CPT | Performed by: STUDENT IN AN ORGANIZED HEALTH CARE EDUCATION/TRAINING PROGRAM

## 2021-11-22 PROCEDURE — 90715 TDAP VACCINE 7 YRS/> IM: CPT | Performed by: STUDENT IN AN ORGANIZED HEALTH CARE EDUCATION/TRAINING PROGRAM

## 2021-11-22 PROCEDURE — G8427 DOCREV CUR MEDS BY ELIG CLIN: HCPCS | Performed by: STUDENT IN AN ORGANIZED HEALTH CARE EDUCATION/TRAINING PROGRAM

## 2021-11-22 PROCEDURE — 99211 OFF/OP EST MAY X REQ PHY/QHP: CPT | Performed by: STUDENT IN AN ORGANIZED HEALTH CARE EDUCATION/TRAINING PROGRAM

## 2021-11-22 PROCEDURE — G8417 CALC BMI ABV UP PARAM F/U: HCPCS | Performed by: STUDENT IN AN ORGANIZED HEALTH CARE EDUCATION/TRAINING PROGRAM

## 2021-11-22 PROCEDURE — G8482 FLU IMMUNIZE ORDER/ADMIN: HCPCS | Performed by: STUDENT IN AN ORGANIZED HEALTH CARE EDUCATION/TRAINING PROGRAM

## 2021-11-22 PROCEDURE — 1036F TOBACCO NON-USER: CPT | Performed by: STUDENT IN AN ORGANIZED HEALTH CARE EDUCATION/TRAINING PROGRAM

## 2021-11-22 RX ORDER — FAMOTIDINE 20 MG/1
20 TABLET, FILM COATED ORAL 2 TIMES DAILY
Qty: 60 TABLET | Refills: 3 | Status: SHIPPED | OUTPATIENT
Start: 2021-11-22

## 2021-11-22 NOTE — PROGRESS NOTES
Prenatal Visit    Jorge Mandel is a 22 y.o. female  at 30w11d    The patient was seen and evaluated. She complains of some decreased appetite. When she eats a large meal she feels like she gets some reflux with dry heaves. She reports positive fetal movements. She denies contractions, vaginal bleeding and leakage of fluid. Signs and symptoms of labor and pre-eclampsia were reviewed with the patient in detail. She is to report any of these if they occur. She currently denies any of these. The patient is Rh positive and Rhogam is not indicated in this pregnancy. The patient is requesting the T-Dap Vaccine (27-36 weeks) this pregnancy. The patient already received  the influenza vaccine this year. The patient requested the COVID-19 vaccine this year. The problem list reflects the active issues addressed during today's visit    Vitals:  BP: 124/78  Weight: 224 lb 6.4 oz (101.8 kg)  Pulse: 90  Patient Position: Sitting  Albumin: Trace  Glucose: Negative  Fundal Height (cm): 28 cm  Fetal Heart Rate: 145     28 week labs: .  1hr GTT: ordered today   28 week CBC:   Lab Results   Component Value Date    WBC 12.2 2021    HGB 12.6 2021    HCT 37.7 2021    MCV 92.2 2021     2021     UA w/ Ur C&S: ordered today     Assessment & Plan:   Jorge Mandel is a 22 y.o. female  at 30w11d   - 28 week labs ordered along with prenatal labs   - VSS, afebrile, denies s/s preE   - Tdap vaccination: is requesting to receive today   - Influenza vaccination: already received at previous visit   - Rhogam: is not indicated in this pregnancy   - COVID-19 vaccination: R/B/A discussed with increased risk of both maternal and fetal morbidity and mortality in unvaccinated pregnant patients who contract COVID-19- patient is requesting today, counseled on going to pharmacy to receive   -  testing indication starting 32 weeks GA: cHTN (no meds) with concurrent fetal growth restriction   - Warning signs reviewed and recommendations when to call or present to the hospital if she experiences signs or symptoms of  labor and pre-eclampsia were reviewed. - The patient was instructed on fetal kick counts. She was instructed that the baby should move at a minimum of ten times within one hour after a meal. The patient was instructed to lay down on her left side twenty minutes after eating and count movements for up to one hour with a target value of ten movements. She was instructed to notify the office if she did not make that target after two attempts or if after any attempt there was less than four movements. - Return in 2 weeks    Patient Active Problem List    Diagnosis Date Noted    FGR (Dx 11/15/21) 2021     AC<10%ile, Total 48%ile      cHTN (no meds) 10/13/2021     New dx. Baseline PreE labs wnl () during ED visit. Baseline P/C 0.13 (10/13).  Chlamydia infection  10/13/2021     Positive 21. TOR neg 10/13      No prenatal care in current pregnancy in second trimester 10/11/2021     Ga 22w4d at time of ob intake       Obesity 10/11/2021    High-risk pregnancy, second trimester 10/11/2021     10/11/21- MFM Referral placed for anatomy scan and NIPT. MSAFP ordered. Return in about 2 weeks (around 2021) for STAN Verma DO  Ob/Gyn Resident  [facility], [address]  2021, 2:08 PM

## 2021-11-23 DIAGNOSIS — O99.810 ABNORMAL GLUCOSE TOLERANCE TEST IN PREGNANCY: Primary | ICD-10-CM

## 2021-11-23 LAB
CULTURE: NORMAL
HGB ELECTROPHORESIS INTERP: NORMAL
Lab: NORMAL
PATHOLOGIST: NORMAL
SPECIMEN DESCRIPTION: NORMAL

## 2021-11-24 LAB — CYSTIC FIBROSIS: NORMAL

## 2021-12-13 ENCOUNTER — ROUTINE PRENATAL (OUTPATIENT)
Dept: OBGYN | Age: 21
End: 2021-12-13
Payer: COMMERCIAL

## 2021-12-13 VITALS
HEART RATE: 89 BPM | BODY MASS INDEX: 37.51 KG/M2 | DIASTOLIC BLOOD PRESSURE: 78 MMHG | SYSTOLIC BLOOD PRESSURE: 125 MMHG | WEIGHT: 225.4 LBS

## 2021-12-13 DIAGNOSIS — Z3A.31 31 WEEKS GESTATION OF PREGNANCY: ICD-10-CM

## 2021-12-13 DIAGNOSIS — O36.5990 PREGNANCY AFFECTED BY FETAL GROWTH RESTRICTION: Primary | ICD-10-CM

## 2021-12-13 PROCEDURE — G8482 FLU IMMUNIZE ORDER/ADMIN: HCPCS | Performed by: STUDENT IN AN ORGANIZED HEALTH CARE EDUCATION/TRAINING PROGRAM

## 2021-12-13 PROCEDURE — 99213 OFFICE O/P EST LOW 20 MIN: CPT | Performed by: STUDENT IN AN ORGANIZED HEALTH CARE EDUCATION/TRAINING PROGRAM

## 2021-12-13 PROCEDURE — 1036F TOBACCO NON-USER: CPT | Performed by: STUDENT IN AN ORGANIZED HEALTH CARE EDUCATION/TRAINING PROGRAM

## 2021-12-13 PROCEDURE — 59025 FETAL NON-STRESS TEST: CPT | Performed by: STUDENT IN AN ORGANIZED HEALTH CARE EDUCATION/TRAINING PROGRAM

## 2021-12-13 PROCEDURE — G8427 DOCREV CUR MEDS BY ELIG CLIN: HCPCS | Performed by: STUDENT IN AN ORGANIZED HEALTH CARE EDUCATION/TRAINING PROGRAM

## 2021-12-13 PROCEDURE — G8417 CALC BMI ABV UP PARAM F/U: HCPCS | Performed by: STUDENT IN AN ORGANIZED HEALTH CARE EDUCATION/TRAINING PROGRAM

## 2021-12-13 NOTE — PROGRESS NOTES
Prenatal Visit    Shoshana Hernandez is a 22 y.o. female  at 32w8d    The patient was seen and evaluated. She complains of some low back and abdominal pain which comes and goes. She denies urinary symptoms or change to her discharge. She also reports some intermittent swelling in her feet. She has known chronic hypertension and is not on medication. Patient is normotensive today. She reports positive fetal movements. She denies contractions, vaginal bleeding and leakage of fluid. Signs and symptoms of labor and pre-eclampsia were reviewed with the patient in detail. She is to report any of these if they occur. She currently denies any of these. The patient is Rh positive and Rhogam is not indicated in this pregnancy. The patient already received  the T-Dap Vaccine (27-36 weeks) this pregnancy. The patient declined the influenza vaccine this year. The patient declined the COVID-19 vaccine this year. The problem list reflects the active issues addressed during today's visit    Vitals:  BP: 125/78  Weight: 225 lb 6.4 oz (102.2 kg)  Pulse: 89  Patient Position: Sitting  Albumin: Trace  Glucose: Negative  Fundal Height (cm): 31 cm  Fetal Heart Rate: 140     NST  Fetal Heart Monitor:  Baseline Heart Rate 140, moderate variability, present accelerations, absent decelerations  [place]: contractions, none      28 week labs: .  1hr GTT: 154, 3hr GTT ordered   28 week CBC:   Lab Results   Component Value Date    WBC 14.6 (H) 2021    HGB 11.7 (L) 2021    HCT 35.0 (L) 2021    MCV 92.8 2021     2021     UA w/ Ur C&S: negative     Assessment & Plan:   Shoshana Hernandez is a 22 y.o. female  at 32w8d   - VSS today   - 28 week labs completed   - 1hr GTT elevated, 3hr GTT ordered, encourage completion   - Tdap vaccination: already received     - Influenza vaccination: declined    - Rhogam: is not indicated in this pregnancy   - COVID-19 vaccination: R/B/A discussed with increased

## 2021-12-15 LAB
ABDOMINAL CIRCUMFERENCE: NORMAL
BIPARIETAL DIAMETER: NORMAL
ESTIMATED FETAL WEIGHT: NORMAL
FEMORAL DIAMETER: NORMAL
HC/AC: NORMAL
HEAD CIRCUMFERENCE: NORMAL

## 2021-12-16 ENCOUNTER — ROUTINE PRENATAL (OUTPATIENT)
Dept: PERINATAL CARE | Age: 21
End: 2021-12-16
Payer: COMMERCIAL

## 2021-12-16 VITALS
HEIGHT: 65 IN | SYSTOLIC BLOOD PRESSURE: 121 MMHG | TEMPERATURE: 97.2 F | BODY MASS INDEX: 38.15 KG/M2 | HEART RATE: 93 BPM | WEIGHT: 229 LBS | RESPIRATION RATE: 16 BRPM | DIASTOLIC BLOOD PRESSURE: 76 MMHG

## 2021-12-16 DIAGNOSIS — O36.5990 INTRAUTERINE GROWTH RESTRICTION AFFECTING ANTEPARTUM CARE OF MOTHER, SINGLE OR UNSPECIFIED FETUS: Primary | ICD-10-CM

## 2021-12-16 DIAGNOSIS — O16.3 HYPERTENSION AFFECTING PREGNANCY IN THIRD TRIMESTER: ICD-10-CM

## 2021-12-16 DIAGNOSIS — Z36.4 ANTENATAL SCREENING FOR FETAL GROWTH RETARDATION USING ULTRASONICS: ICD-10-CM

## 2021-12-16 DIAGNOSIS — O09.33 INSUFFICIENT PRENATAL CARE IN THIRD TRIMESTER: ICD-10-CM

## 2021-12-16 DIAGNOSIS — O99.213 OBESITY AFFECTING PREGNANCY IN THIRD TRIMESTER: ICD-10-CM

## 2021-12-16 DIAGNOSIS — Z3A.32 32 WEEKS GESTATION OF PREGNANCY: ICD-10-CM

## 2021-12-16 DIAGNOSIS — O99.810 ABNORMAL MATERNAL GLUCOSE TOLERANCE, ANTEPARTUM: ICD-10-CM

## 2021-12-16 PROCEDURE — 76816 OB US FOLLOW-UP PER FETUS: CPT | Performed by: OBSTETRICS & GYNECOLOGY

## 2021-12-16 PROCEDURE — 76819 FETAL BIOPHYS PROFIL W/O NST: CPT | Performed by: OBSTETRICS & GYNECOLOGY

## 2021-12-16 PROCEDURE — 76821 MIDDLE CEREBRAL ARTERY ECHO: CPT | Performed by: OBSTETRICS & GYNECOLOGY

## 2021-12-16 PROCEDURE — 76820 UMBILICAL ARTERY ECHO: CPT | Performed by: OBSTETRICS & GYNECOLOGY

## 2021-12-20 ENCOUNTER — ROUTINE PRENATAL (OUTPATIENT)
Dept: OBGYN | Age: 21
End: 2021-12-20
Payer: COMMERCIAL

## 2021-12-20 VITALS
BODY MASS INDEX: 38.09 KG/M2 | HEART RATE: 91 BPM | WEIGHT: 228.9 LBS | DIASTOLIC BLOOD PRESSURE: 78 MMHG | SYSTOLIC BLOOD PRESSURE: 119 MMHG

## 2021-12-20 DIAGNOSIS — I10 CHRONIC HYPERTENSION: ICD-10-CM

## 2021-12-20 DIAGNOSIS — O36.5990 PREGNANCY AFFECTED BY FETAL GROWTH RESTRICTION: ICD-10-CM

## 2021-12-20 DIAGNOSIS — O09.93 HIGH-RISK PREGNANCY IN THIRD TRIMESTER: Primary | ICD-10-CM

## 2021-12-20 PROCEDURE — 1036F TOBACCO NON-USER: CPT | Performed by: STUDENT IN AN ORGANIZED HEALTH CARE EDUCATION/TRAINING PROGRAM

## 2021-12-20 PROCEDURE — 59025 FETAL NON-STRESS TEST: CPT | Performed by: STUDENT IN AN ORGANIZED HEALTH CARE EDUCATION/TRAINING PROGRAM

## 2021-12-20 PROCEDURE — G8482 FLU IMMUNIZE ORDER/ADMIN: HCPCS | Performed by: STUDENT IN AN ORGANIZED HEALTH CARE EDUCATION/TRAINING PROGRAM

## 2021-12-20 PROCEDURE — G8427 DOCREV CUR MEDS BY ELIG CLIN: HCPCS | Performed by: STUDENT IN AN ORGANIZED HEALTH CARE EDUCATION/TRAINING PROGRAM

## 2021-12-20 PROCEDURE — 99213 OFFICE O/P EST LOW 20 MIN: CPT | Performed by: STUDENT IN AN ORGANIZED HEALTH CARE EDUCATION/TRAINING PROGRAM

## 2021-12-20 PROCEDURE — G8417 CALC BMI ABV UP PARAM F/U: HCPCS | Performed by: STUDENT IN AN ORGANIZED HEALTH CARE EDUCATION/TRAINING PROGRAM

## 2021-12-20 NOTE — PROGRESS NOTES
TESTING NOTE    Bethany Collado is a 22 y.o. female  at [address] [place] [address]    The patient presented to the office today for a NST. Has no complaints today. Denies any s/s of preeclampsia. Patient Active Problem List   Diagnosis    No prenatal care in current pregnancy in second trimester    Obesity    High-risk pregnancy, second trimester    cHTN (no meds)    Chlamydia infection     FGR (Dx 11/15/21)    Elevated 1hr GTT       Vitals:  Vitals:    21 1310   BP: 119/78   Pulse: 91       NST:   Fetal heart rate baseline: 155, moderate variability, accelerations present, decelerations absent    The tracing has been reviewed and is considered reactive. Assessment/Plan:  1.  Bethany Collado is a 22 y.o. female  at [facility] here for NST    - Getting  testing for chronic HTN and fetal growth restriction    - Blood pressure normotensive   - Tracing reactive    -  BPP on 21 in [facility] office   - Next ultrasound on 21     Devi Amaro DO  Ob/Gyn Resident   2021, 1:39 PM

## 2021-12-23 ENCOUNTER — ROUTINE PRENATAL (OUTPATIENT)
Dept: PERINATAL CARE | Age: 21
End: 2021-12-23
Payer: COMMERCIAL

## 2021-12-23 VITALS
RESPIRATION RATE: 16 BRPM | DIASTOLIC BLOOD PRESSURE: 86 MMHG | BODY MASS INDEX: 38.82 KG/M2 | WEIGHT: 233 LBS | TEMPERATURE: 96.8 F | HEIGHT: 65 IN | HEART RATE: 108 BPM | SYSTOLIC BLOOD PRESSURE: 132 MMHG

## 2021-12-23 DIAGNOSIS — O16.3 HYPERTENSION AFFECTING PREGNANCY IN THIRD TRIMESTER: ICD-10-CM

## 2021-12-23 DIAGNOSIS — O99.213 OBESITY AFFECTING PREGNANCY IN THIRD TRIMESTER: ICD-10-CM

## 2021-12-23 DIAGNOSIS — O41.93X0 ABNORMAL AMNIOTIC FLUID IN THIRD TRIMESTER, SINGLE OR UNSPECIFIED FETUS: ICD-10-CM

## 2021-12-23 DIAGNOSIS — Z3A.33 33 WEEKS GESTATION OF PREGNANCY: ICD-10-CM

## 2021-12-23 DIAGNOSIS — O36.5990 INTRAUTERINE GROWTH RESTRICTION AFFECTING ANTEPARTUM CARE OF MOTHER, SINGLE OR UNSPECIFIED FETUS: Primary | ICD-10-CM

## 2021-12-23 DIAGNOSIS — O99.810 ABNORMAL MATERNAL GLUCOSE TOLERANCE, ANTEPARTUM: ICD-10-CM

## 2021-12-23 DIAGNOSIS — O09.33 INSUFFICIENT PRENATAL CARE IN THIRD TRIMESTER: ICD-10-CM

## 2021-12-23 PROCEDURE — 76819 FETAL BIOPHYS PROFIL W/O NST: CPT | Performed by: OBSTETRICS & GYNECOLOGY

## 2021-12-23 PROCEDURE — 76820 UMBILICAL ARTERY ECHO: CPT | Performed by: OBSTETRICS & GYNECOLOGY

## 2021-12-23 PROCEDURE — 76821 MIDDLE CEREBRAL ARTERY ECHO: CPT | Performed by: OBSTETRICS & GYNECOLOGY

## 2021-12-27 ENCOUNTER — ROUTINE PRENATAL (OUTPATIENT)
Dept: OBGYN | Age: 21
End: 2021-12-27
Payer: COMMERCIAL

## 2021-12-27 VITALS
SYSTOLIC BLOOD PRESSURE: 127 MMHG | WEIGHT: 229.4 LBS | DIASTOLIC BLOOD PRESSURE: 76 MMHG | BODY MASS INDEX: 38.17 KG/M2 | HEART RATE: 92 BPM

## 2021-12-27 DIAGNOSIS — Z3A.33 33 WEEKS GESTATION OF PREGNANCY: Primary | ICD-10-CM

## 2021-12-27 DIAGNOSIS — O09.93 HRP (HIGH RISK PREGNANCY), THIRD TRIMESTER: ICD-10-CM

## 2021-12-27 PROCEDURE — G8427 DOCREV CUR MEDS BY ELIG CLIN: HCPCS | Performed by: STUDENT IN AN ORGANIZED HEALTH CARE EDUCATION/TRAINING PROGRAM

## 2021-12-27 PROCEDURE — 99211 OFF/OP EST MAY X REQ PHY/QHP: CPT | Performed by: STUDENT IN AN ORGANIZED HEALTH CARE EDUCATION/TRAINING PROGRAM

## 2021-12-27 PROCEDURE — G8417 CALC BMI ABV UP PARAM F/U: HCPCS | Performed by: STUDENT IN AN ORGANIZED HEALTH CARE EDUCATION/TRAINING PROGRAM

## 2021-12-27 PROCEDURE — G8482 FLU IMMUNIZE ORDER/ADMIN: HCPCS | Performed by: STUDENT IN AN ORGANIZED HEALTH CARE EDUCATION/TRAINING PROGRAM

## 2021-12-27 PROCEDURE — 1036F TOBACCO NON-USER: CPT | Performed by: STUDENT IN AN ORGANIZED HEALTH CARE EDUCATION/TRAINING PROGRAM

## 2021-12-27 PROCEDURE — 99213 OFFICE O/P EST LOW 20 MIN: CPT | Performed by: STUDENT IN AN ORGANIZED HEALTH CARE EDUCATION/TRAINING PROGRAM

## 2021-12-27 NOTE — PROGRESS NOTES
Prenatal Visit    Ang Torres is a 22 y.o. female  at 32w10d    The patient was seen and evaluated. She complains of nothing today. She reports positive fetal movements. She denies contractions, vaginal bleeding and leakage of fluid. Signs and symptoms of labor and pre-eclampsia were reviewed with the patient in detail. She is to report any of these if they occur. She currently denies any of these. Initial BP was elevated at 779X systolic but repeat was normotensive. The patient is Rh positive and Rhogam is not indicated in this pregnancy and informed the patient. The patient already received  the T-Dap Vaccine (27-36 weeks) this pregnancy. The patient already received  the influenza vaccine this year. The patient requested the COVID-19 vaccine this year and will be making an appointment. The problem list reflects the active issues addressed during today's visit    Vitals:  BP: 127/76  Weight: 229 lb 6.4 oz (104.1 kg)  Pulse: 92  Patient Position: Sitting  Albumin: Trace  Glucose: Negative  Fundal Height (cm): 33 cm  Fetal Heart Rate: 150  Movement: Present     28 week labs: .  1hr GTT: 154   28 week CBC:   Lab Results   Component Value Date    WBC 14.6 (H) 2021    HGB 11.7 (L) 2021    HCT 35.0 (L) 2021    MCV 92.8 2021     2021     UA w/ Ur C&S: negative on 21     Assessment & Plan:   Ang Torres is a 22 y.o. female  at 32w10d   - 28 week labs completed and 3hr GTT ordered- pt will get completed this week   - Last MFM scan: : Cephalic, Anterior, BPP 8/8, UA and MCA doppler normal, 1 week BPP and doppler f/u with weekly NST, scheduled 21   - Tdap vaccination: already received     - Influenza vaccination: already received     - Rhogam: is not indicated in this pregnancy   - COVID-19 vaccination: R/B/A discussed with increased risk of both maternal and fetal morbidity and mortality in unvaccinated pregnant patients who contract COVID-19- patient is requesting it and will be making an appt to get it soon. -  testing indication starting 32 weeks GA: FGR with AC <10% on 21- scheduled for weekly BPP, next MFM scan on 21    - Warning signs reviewed and recommendations when to call or present to the hospital if she experiences signs or symptoms of  labor and pre-eclampsia were reviewed. - The patient was instructed on fetal kick counts. She was instructed that the baby should move at a minimum of ten times within one hour after a meal. The patient was instructed to lay down on her left side twenty minutes after eating and count movements for up to one hour with a target value of ten movements. She was instructed to notify the office if she did not make that target after two attempts or if after any attempt there was less than four movements. Patient Active Problem List    Diagnosis Date Noted    Elevated 1hr GTT 2021     3 hr GTT ordered      FGR (Dx 11/15/21) 2021     AC<10%ile, Total 48%ile      cHTN (no meds) 10/13/2021     New dx. Baseline PreE labs wnl () during ED visit. Baseline P/C 0.13 (10/13).  Chlamydia infection  10/13/2021     Positive 21. TOR neg 10/13      No prenatal care in current pregnancy in second trimester 10/11/2021     Ga 22w4d at time of ob intake       Obesity 10/11/2021     Early GTT wnl at 22w6d      High-risk pregnancy, second trimester 10/11/2021     10/11/21- MFM Referral placed for anatomy scan and NIPT. MSAFP ordered. Return in about 2 weeks (around 1/10/2022) for ANA MARÍA Hanson DO  Ob/Gyn Resident  [facility], [address]  2021, 1:40 PM         Attending Physician Statement  I have discussed the care of Jonelle Eduar, including pertinent history and exam findings,  with the resident. I have reviewed the key elements of all parts of the encounter with the resident.   I agree with the assessment, plan and orders as documented by the resident.   (GE Modifier)          Adelfo Michel, DO

## 2021-12-30 ENCOUNTER — ROUTINE PRENATAL (OUTPATIENT)
Dept: PERINATAL CARE | Age: 21
End: 2021-12-30
Payer: COMMERCIAL

## 2021-12-30 VITALS
TEMPERATURE: 97.2 F | RESPIRATION RATE: 16 BRPM | BODY MASS INDEX: 38.49 KG/M2 | SYSTOLIC BLOOD PRESSURE: 129 MMHG | WEIGHT: 231 LBS | HEART RATE: 90 BPM | HEIGHT: 65 IN | DIASTOLIC BLOOD PRESSURE: 72 MMHG

## 2021-12-30 DIAGNOSIS — O10.913 CHRONIC HYPERTENSION COMPLICATING OR REASON FOR CARE DURING PREGNANCY, THIRD TRIMESTER: ICD-10-CM

## 2021-12-30 DIAGNOSIS — O99.213 OBESITY AFFECTING PREGNANCY IN THIRD TRIMESTER: ICD-10-CM

## 2021-12-30 DIAGNOSIS — O09.33 INSUFFICIENT PRENATAL CARE IN THIRD TRIMESTER: ICD-10-CM

## 2021-12-30 DIAGNOSIS — O36.5931 INTRAUTERINE GROWTH RESTRICTION AFFECTING ANTEPARTUM CARE OF MOTHER IN THIRD TRIMESTER, FETUS 1: Primary | ICD-10-CM

## 2021-12-30 PROCEDURE — 76819 FETAL BIOPHYS PROFIL W/O NST: CPT | Performed by: OBSTETRICS & GYNECOLOGY

## 2021-12-30 PROCEDURE — 76820 UMBILICAL ARTERY ECHO: CPT | Performed by: OBSTETRICS & GYNECOLOGY

## 2021-12-30 PROCEDURE — 76821 MIDDLE CEREBRAL ARTERY ECHO: CPT | Performed by: OBSTETRICS & GYNECOLOGY

## 2022-01-05 ENCOUNTER — HOSPITAL ENCOUNTER (OUTPATIENT)
Age: 22
Discharge: HOME | End: 2022-01-05
Attending: OBSTETRICS & GYNECOLOGY | Admitting: OBSTETRICS & GYNECOLOGY
Payer: COMMERCIAL

## 2022-01-05 VITALS — HEART RATE: 105 BPM | SYSTOLIC BLOOD PRESSURE: 137 MMHG | DIASTOLIC BLOOD PRESSURE: 84 MMHG

## 2022-01-05 PROBLEM — Z3A.35 35 WEEKS GESTATION OF PREGNANCY: Status: ACTIVE | Noted: 2022-01-05

## 2022-01-05 LAB
-: ABNORMAL
AMORPHOUS: ABNORMAL
BACTERIA: ABNORMAL
BILIRUBIN URINE: NEGATIVE
CANDIDA SPECIES, DNA PROBE: NEGATIVE
CASTS UA: ABNORMAL /LPF (ref 0–2)
CASTS UA: ABNORMAL /LPF (ref 0–2)
COLOR: YELLOW
CRYSTALS, UA: ABNORMAL /HPF
EPITHELIAL CELLS UA: ABNORMAL /HPF (ref 0–5)
GARDNERELLA VAGINALIS, DNA PROBE: POSITIVE
GLUCOSE URINE: NEGATIVE
KETONES, URINE: NEGATIVE
LEUKOCYTE ESTERASE, URINE: ABNORMAL
MUCUS: ABNORMAL
NITRITE, URINE: NEGATIVE
OTHER OBSERVATIONS UA: ABNORMAL
PH UA: 7.5 (ref 5–8)
PROTEIN UA: NEGATIVE
RBC UA: ABNORMAL /HPF (ref 0–2)
RENAL EPITHELIAL, UA: ABNORMAL /HPF
SOURCE: ABNORMAL
SPECIFIC GRAVITY UA: 1 (ref 1–1.03)
TRICHOMONAS VAGINALIS DNA: NEGATIVE
TRICHOMONAS: ABNORMAL
TURBIDITY: CLEAR
URINE HGB: NEGATIVE
UROBILINOGEN, URINE: NORMAL
WBC UA: ABNORMAL /HPF (ref 0–5)
YEAST: ABNORMAL

## 2022-01-05 PROCEDURE — 87086 URINE CULTURE/COLONY COUNT: CPT

## 2022-01-05 PROCEDURE — 99213 OFFICE O/P EST LOW 20 MIN: CPT

## 2022-01-05 PROCEDURE — 87491 CHLMYD TRACH DNA AMP PROBE: CPT

## 2022-01-05 PROCEDURE — 87081 CULTURE SCREEN ONLY: CPT

## 2022-01-05 PROCEDURE — 6370000000 HC RX 637 (ALT 250 FOR IP): Performed by: STUDENT IN AN ORGANIZED HEALTH CARE EDUCATION/TRAINING PROGRAM

## 2022-01-05 PROCEDURE — 87480 CANDIDA DNA DIR PROBE: CPT

## 2022-01-05 PROCEDURE — 87591 N.GONORRHOEAE DNA AMP PROB: CPT

## 2022-01-05 PROCEDURE — 81001 URINALYSIS AUTO W/SCOPE: CPT

## 2022-01-05 PROCEDURE — 87510 GARDNER VAG DNA DIR PROBE: CPT

## 2022-01-05 PROCEDURE — 87660 TRICHOMONAS VAGIN DIR PROBE: CPT

## 2022-01-05 RX ORDER — ACETAMINOPHEN 500 MG
1000 TABLET ORAL EVERY 6 HOURS PRN
Status: DISCONTINUED | OUTPATIENT
Start: 2022-01-05 | End: 2022-01-06 | Stop reason: HOSPADM

## 2022-01-05 RX ORDER — ONDANSETRON 2 MG/ML
4 INJECTION INTRAMUSCULAR; INTRAVENOUS EVERY 6 HOURS PRN
Status: DISCONTINUED | OUTPATIENT
Start: 2022-01-05 | End: 2022-01-06 | Stop reason: HOSPADM

## 2022-01-05 RX ORDER — ONDANSETRON 4 MG/1
4 TABLET, ORALLY DISINTEGRATING ORAL EVERY 8 HOURS PRN
Status: DISCONTINUED | OUTPATIENT
Start: 2022-01-05 | End: 2022-01-06 | Stop reason: HOSPADM

## 2022-01-05 RX ORDER — METRONIDAZOLE 500 MG/1
500 TABLET ORAL 2 TIMES DAILY
Qty: 14 TABLET | Refills: 0 | Status: SHIPPED | OUTPATIENT
Start: 2022-01-05 | End: 2022-01-12

## 2022-01-05 RX ADMIN — ACETAMINOPHEN 1000 MG: 500 TABLET ORAL at 21:35

## 2022-01-05 ASSESSMENT — PAIN SCALES - GENERAL: PAINLEVEL_OUTOF10: 2

## 2022-01-06 ENCOUNTER — ROUTINE PRENATAL (OUTPATIENT)
Dept: PERINATAL CARE | Age: 22
End: 2022-01-06
Payer: COMMERCIAL

## 2022-01-06 VITALS
RESPIRATION RATE: 16 BRPM | HEIGHT: 65 IN | DIASTOLIC BLOOD PRESSURE: 82 MMHG | TEMPERATURE: 96.9 F | BODY MASS INDEX: 38.82 KG/M2 | HEART RATE: 92 BPM | WEIGHT: 233 LBS | SYSTOLIC BLOOD PRESSURE: 120 MMHG

## 2022-01-06 DIAGNOSIS — O41.93X0 ABNORMAL AMNIOTIC FLUID IN THIRD TRIMESTER, SINGLE OR UNSPECIFIED FETUS: ICD-10-CM

## 2022-01-06 DIAGNOSIS — Z13.89 ENCOUNTER FOR ROUTINE SCREENING FOR MALFORMATION USING ULTRASONICS: ICD-10-CM

## 2022-01-06 DIAGNOSIS — Z36.4 ANTENATAL SCREENING FOR FETAL GROWTH RETARDATION USING ULTRASONICS: ICD-10-CM

## 2022-01-06 DIAGNOSIS — O99.810 ABNORMAL MATERNAL GLUCOSE TOLERANCE, ANTEPARTUM: ICD-10-CM

## 2022-01-06 DIAGNOSIS — Z3A.35 35 WEEKS GESTATION OF PREGNANCY: ICD-10-CM

## 2022-01-06 DIAGNOSIS — O16.3 HYPERTENSION AFFECTING PREGNANCY IN THIRD TRIMESTER: Primary | ICD-10-CM

## 2022-01-06 DIAGNOSIS — O99.213 OBESITY AFFECTING PREGNANCY IN THIRD TRIMESTER: ICD-10-CM

## 2022-01-06 LAB
ABDOMINAL CIRCUMFERENCE: NORMAL
BIPARIETAL DIAMETER: NORMAL
C TRACH DNA GENITAL QL NAA+PROBE: NEGATIVE
CULTURE: NORMAL
ESTIMATED FETAL WEIGHT: NORMAL
FEMORAL DIAMETER: NORMAL
HC/AC: NORMAL
HEAD CIRCUMFERENCE: NORMAL
Lab: NORMAL
N. GONORRHOEAE DNA: NEGATIVE
SPECIMEN DESCRIPTION: NORMAL
SPECIMEN DESCRIPTION: NORMAL

## 2022-01-06 PROCEDURE — 76820 UMBILICAL ARTERY ECHO: CPT | Performed by: OBSTETRICS & GYNECOLOGY

## 2022-01-06 PROCEDURE — 76821 MIDDLE CEREBRAL ARTERY ECHO: CPT | Performed by: OBSTETRICS & GYNECOLOGY

## 2022-01-06 PROCEDURE — 76805 OB US >/= 14 WKS SNGL FETUS: CPT | Performed by: OBSTETRICS & GYNECOLOGY

## 2022-01-06 PROCEDURE — 76819 FETAL BIOPHYS PROFIL W/O NST: CPT | Performed by: OBSTETRICS & GYNECOLOGY

## 2022-01-06 NOTE — FLOWSHEET NOTE
Discharge instructions rev'd with pt, verb understanding et compliance.  Denies any additional questions @ this time

## 2022-01-06 NOTE — FLOWSHEET NOTE
Pt arrive with complaints of abdominal pain/cramping. Pt states it has been going on for couple days and today it got worse. Pt denies any trauma or falls. Positive fetal movment.  Pt denies and contractions or rupture of membrane

## 2022-01-06 NOTE — H&P
Evangelista Leggett OBSTETRICAL HISTORY [facility] ED    Date: 2022       Time: 8:40 AM   Patient Name: Madison Carty     Patient : 2000  Room/Bed: [place] [address]    Admission Date/Time: 2022  8:19 PM      CC: Cramping     HPI: Madison Carty is a 22 y.o.  at 35w1d who presents to Labor and Delivery Triage with the complaint of intermittent 5/10 cramping over the past few days, worse today. No trauma or falls. Denies recent intercourse. The patient reports fetal movement is present, denies contractions, denies loss of fluid, denies vaginal bleeding. Patient denies any headache, visual changes, difficulty breathing, RUQ pain, N/V, F/C, and pain/swelling in lower extremities. Denies any dysuria or vaginal discharge. '    DATING:  LMP: Patient's last menstrual period was 2021 (approximate).   Estimated Date of Delivery: 22   Based on: early ultrasound, at 13 3/7 weeks GA    PREGNANCY RISK FACTORS:  Patient Active Problem List   Diagnosis    No prenatal care in current pregnancy in second trimester    Obesity    High-risk pregnancy, second trimester    cHTN (no meds)    Chlamydia infection     FGR (Dx 11/15/21)    Elevated 1hr GTT    35 weeks gestation of pregnancy        Steroids Given In This Pregnancy:  no     REVIEW OF SYSTEMS:   Constitutional: negative fever, negative chills, negative weight changes   HEENT: negative visual disturbances, negative headaches, negative dizziness, negative hearing loss  Breast: Negative breast abnormalities, negative breast lumps, negative nipple discharge  Respiratory: negative dyspnea, negative cough, negative SOB  Cardiovascular: negative chest pain,  negative palpitations, negative arrhythmia, negative syncope   Gastrointestinal: positive abdominal pain, negative RUQ pain, negative N/V, negative diarrhea, negative constipation, negative bowel changes, negative heartburn   Genitourinary: negative dysuria, negative hematuria, negative urinary incontinence, negative vaginal discharge, negative vaginal bleeding or spotting  Dermatological: negative rash, negative pruritis, negative mole or other skin changes  Hematologic: negative bruising  Immunologic/Lymphatic: negative recent illness, negative recent sick contact  Musculoskeletal: negative back pain, negative myalgias, negative arthralgias  Neurological:  negative dizziness, negative migraines, negative seizures, negative weakness  Behavior/Psych: negative depression, negative anxiety, negative SI, negative HI    OBSTETRICAL HISTORY:   OB History    Para Term  AB Living   1 0 0 0 0 0   SAB IAB Ectopic Molar Multiple Live Births   0 0 0 0 0 0      # Outcome Date GA Lbr Andrew/2nd Weight Sex Delivery Anes PTL Lv   1 Current                PAST MEDICAL HISTORY:   has a past medical history of Chronic HTN and Obese. PAST SURGICAL HISTORY:   has no past surgical history on file. ALLERGIES:  has No Known Allergies. MEDICATIONS:  Prior to Admission medications    Medication Sig Start Date End Date Taking? Authorizing Provider   metroNIDAZOLE (FLAGYL) 500 MG tablet Take 1 tablet by mouth 2 times daily for 7 days 22 Yes Luz Zavala, DO   famotidine (PEPCID) 20 MG tablet Take 1 tablet by mouth 2 times daily 21   Kinsey Romero, DO   aspirin EC 81 MG EC tablet Take 1 tablet by mouth daily 10/13/21   Bhaskar Hussein, DO   Prenatal Vit-Fe Fumarate-FA (PRENATAL VITAMIN) 27-1 MG TABS tablet Take 1 tablet by mouth daily May substitute with any prenatal vit pt insurance will cover 10/11/21   Shea Peña MD       FAMILY HISTORY:  family history includes Diabetes in her paternal grandmother; No Known Problems in her brother, father, maternal grandfather, maternal grandmother, mother, paternal grandfather, and sister. SOCIAL HISTORY:   reports that she has never smoked. She has never used smokeless tobacco. She reports previous alcohol use.  She reports that she does not use drugs. VITALS:  Vitals:    01/05/22 2039   BP: 137/84   Pulse: 105         PHYSICAL EXAM:  Fetal Heart Monitor:  Baseline Heart Rate 140, moderate variability, absent decelerations  [place]: no contractions    General appearance:  no apparent distress, alert and cooperative  HEENT: head atraumatic, normocephalic, moist mucous membranes, trachea midline  Neurologic:  alert, oriented, normal speech, no focal findings or movement disorder noted  Lungs:  No increased work of breathing, good air exchange  Heart:  regular rate and rhythm  Abdomen:  soft, gravid, non-tender, no rebound, guarding, or rigidity, no RUQ or epigastric tenderness, no signs or symptoms of abruption  Extremities:  no calf tenderness, non edematous, no varicosities, full range of motion in all four extremities  Musculoskeletal: Gross strength equal and intact throughout, no gross abnormalities, range of motion normal in hips, knees, shoulders and spine  Psychiatric: Mood appropriate, normal affect   Rectal Exam: not indicated  Pelvic Exam:   Chaperone for Intimate Exam: Chaperone was present for entire exam, Chaperone Name: [facility] RN  Sterile Speculum Exam:   Urethral meatus: normal appearing   Vulva: Normal hair distribution, normal appearing vulva, no masses, tenderness or lesions, normal clitoris   Vagina: Normal appearing vaginal mucosa without lesions, moderate  vaginal discharge noted in the posterior vault, no lacerations   Cervix: Normal appearing cervix without lesions, external os visibly closed, no lacerations or abnormal lesions visualized, no bleeding noted  Sterile Vaginal Exam:  Cervix: No cervical motion tenderness   Uterus: Is gravid, Normal size, shape, consistency and non-tender  Cervix: Fingertip/thick/high     PRENATAL LAB RESULTS:   Blood Type/Rh: O pos  Antibody Screen: negative  Hemoglobin, Hematocrit, Platelets: Hgb 12.8/PRX81.9/YDV813  Rubella: negative  T.  Pallidum, IgG: non-reactive  Hepatitis B Surface Antigen: non-reactive   Hepatitis C Antibody: non-reactive   HIV: non-reactive   Sickle Cell Screen: negative  Gonorrhea: negative  Chlamydia: positive 21; negative 10/13/21  Urine culture: negative, date: 21 and 21    Early 1 hour Glucose Tolerance Test: 101  1 hour Glucose Tolerance Test: 154  3 hour Glucose Tolerance Test: ordered, not completed    Group B Strep: not done    Cystic Fibrosis Screen: negative  First Trimester Screen: not available  MSAFP/Multiple Markers: not available  Non-Invasive Prenatal Testing: low risk for aneuploidy  Anatomy US: anterior placenta, 3VC, male gender,  normal anatomy, fetal growth restriction noted    ASSESSMENT & PLAN:  Niranjan Tubbs is a 22 y.o. female  at 35w1d IUP   - GBS unknown / Rh positive / R immune    Cramping   - cEFM/TOCO: Cat I tracing, no contractions   - SSE: no lesions, lacerations, or bleeding, moderate discharge present   - SVE: fingertip/thick/high > patient declined recheck   - LBUS: BPP 8/8, LEONARDO 10.1 cm   - Vaginitis positive for Bacterial Vaginosis; Flagyl sent to patient's pharmacy   - Gc/C pending, will follow results   - UA unremarkable, UCx pending will follow results   - GBS collected at this time   - Tylenol PRN   - PO hydration   - Upon chart review, patient has diagnosis of FGR w/ cHTN (no meds) and currently within delivery window. Bedside US performed with reassuring fetal status, no Oligohydramnios noted. Discussed plan for IOL with attending Dr. Dontrell Noel and recommends delivery at 37w0d. Discussed with patient and agreeable to plan of care. Patient has MFM appointment tomorrow and next prenatal appointment 1/10/22. Patient encouraged to attend. Upon re-evaluation, patient reports cramping is improved. FHT reviewed in entirety with senior resident and Cat I, no contractions. VSS. Patient may be discharged home.  Labor precautions, bleeding precautions, adequate PO hydration, fetal kick counts reviewed with the patient. All questions answered and concerns addressed. Patient verbalized understanding. cHTN (no meds)   - BP stable today   - Baseline PreE labs wnl, P/C 0.13   - Denies s/s of PreE   - Patient reports compliance with ASA 81 mg daily    Late prenatal care   - Ga 22w4d at time of ob intake    FGR   - AC<10%ile, Total 48%ile on MFM US 11/15/21   - Fetal status reassuring with BPP and dopplers as of 21    Elevated 1 hr GTT, no 3 hr    Hx Chlamydia (TOR neg)     BMI  38    Patient Active Problem List    Diagnosis Date Noted    35 weeks gestation of pregnancy 2022    Elevated 1hr GTT 2021     3 hr GTT ordered      FGR (Dx 11/15/21) 2021     AC<10%ile, Total 48%ile    Recommend delivery at 37w0d. Per ACOG window is 34w-37w6      cHTN (no meds) 10/13/2021     New dx. Baseline PreE labs wnl () during ED visit. Baseline P/C 0.13 (10/13).  Chlamydia infection  10/13/2021     Positive 21. TOR neg 10/13      No prenatal care in current pregnancy in second trimester 10/11/2021     Ga 22w4d at time of ob intake       Obesity 10/11/2021     Early GTT wnl at 22w6d      High-risk pregnancy, second trimester 10/11/2021     10/11/21- MFM Referral placed for anatomy scan and NIPT. MSAFP ordered. Plan discussed with Dr. Kyler Moreno, who is agreeable. Steroids given this admission: No    Risks, benefits, alternatives and possible complications have been discussed in detail with the patient. Admission, and post admission procedures and expectations were discussed in detail. All questions were answered.     Attending's Name: Dr. Ernestina Wadsworth DO  Ob/Gyn Resident  2022, 8:40 AM     Date: 2022  Time: 8:06 AM      Patient Name: Dutch Villanueva  Patient : 2000  Room/Bed: [place] [address]  Admission Date/Time: 2022  8:19 PM        Attending Physician Statement  I have discussed the care of Dutch Villanueva, including pertinent history and exam findings with the resident. I have reviewed and edited their note in the electronic medical record. The key elements of all parts of the encounter have been performed/reviewed by me . I agree with the assessment, plan and orders as documented by the resident. The level of care submitted represents to the best of my ability the care documented in the medical record today. GC Modifier. This service has been performed in part by a resident under the direction of a teaching physician.         Attending's Name:  Shaneka Nichole DO

## 2022-01-09 LAB
CULTURE: NORMAL
Lab: NORMAL
SPECIMEN DESCRIPTION: NORMAL

## 2022-01-10 ENCOUNTER — ROUTINE PRENATAL (OUTPATIENT)
Dept: OBGYN | Age: 22
End: 2022-01-10
Payer: COMMERCIAL

## 2022-01-10 VITALS
BODY MASS INDEX: 38.52 KG/M2 | DIASTOLIC BLOOD PRESSURE: 76 MMHG | HEART RATE: 89 BPM | WEIGHT: 231.5 LBS | SYSTOLIC BLOOD PRESSURE: 119 MMHG

## 2022-01-10 DIAGNOSIS — N94.6 DYSMENORRHEA: ICD-10-CM

## 2022-01-10 DIAGNOSIS — Z3A.35 35 WEEKS GESTATION OF PREGNANCY: Primary | ICD-10-CM

## 2022-01-10 PROCEDURE — 99211 OFF/OP EST MAY X REQ PHY/QHP: CPT | Performed by: STUDENT IN AN ORGANIZED HEALTH CARE EDUCATION/TRAINING PROGRAM

## 2022-01-10 PROCEDURE — 99212 OFFICE O/P EST SF 10 MIN: CPT | Performed by: STUDENT IN AN ORGANIZED HEALTH CARE EDUCATION/TRAINING PROGRAM

## 2022-01-10 PROCEDURE — G8427 DOCREV CUR MEDS BY ELIG CLIN: HCPCS | Performed by: STUDENT IN AN ORGANIZED HEALTH CARE EDUCATION/TRAINING PROGRAM

## 2022-01-10 PROCEDURE — 1036F TOBACCO NON-USER: CPT | Performed by: STUDENT IN AN ORGANIZED HEALTH CARE EDUCATION/TRAINING PROGRAM

## 2022-01-10 PROCEDURE — G8482 FLU IMMUNIZE ORDER/ADMIN: HCPCS | Performed by: STUDENT IN AN ORGANIZED HEALTH CARE EDUCATION/TRAINING PROGRAM

## 2022-01-10 PROCEDURE — G8417 CALC BMI ABV UP PARAM F/U: HCPCS | Performed by: STUDENT IN AN ORGANIZED HEALTH CARE EDUCATION/TRAINING PROGRAM

## 2022-01-10 NOTE — PROGRESS NOTES
Prenatal Visit    Jake Conrad is a 22 y.o. female  at 26w5d    The patient was seen and evaluated. She is complaining of irregular contractions, states they are [employer]. She reports positive fetal movements. She denies regular contractions, vaginal bleeding and leakage of fluid. Signs and symptoms of labor and pre-eclampsia were reviewed with the patient in detail. She is to report any of these if they occur. She currently denies any signs or symptoms of pre-clampsia including headache, vision changes, RUQ pain. The patient is Rh positive and Rhogam is not indicated in this pregnancy, informed the patient. The patient already received  the T-Dap Vaccine (27-36 weeks) this pregnancy. The patient already received  the influenza vaccine this year. The patient requested the COVID-19 vaccine this year. The problem list reflects the active issues addressed during today's visit. Allergies:  No Known Allergies    Vitals:  BP: 119/76  Weight: 231 lb 8 oz (105 kg)  Pulse: 89  Patient Position: Prone  Albumin: 1+  Glucose: Negative  Fundal Height (cm): 35 cm  Fetal Heart Rate: 150  Movement: Present     Labs:  Group Beta Strep collection was negative. NST:   Fetal heart rate baseline: 150, moderate variability, accelerations present, absent decelerations     The tracing has been reviewed and is considered reactive. Assessment & Plan: Jake Conrad is a 22 y.o. female  at 26w5d   - GBS testing was completed, negative   - Tdap vaccination: discussed recommendations for TDAP immunization, patient already received.    - Rhogam: not indicated   - Influenza vaccination: already received   - COVID-19 vaccination: R/B/A discussed with increased risk of both maternal and fetal morbidity and mortality in unvaccinated pregnant patients who contract COVID-19- patient is requesting today, patient states she is making appt with pharmacy   -  testing indication: cHTN (no meds)- scheduled for weekly NST and BPP   - Warning signs reviewed and recommendations when to call or present to the hospital if she experiences signs or symptoms of  labor and pre-eclampsia were reviewed. - The patient was instructed on fetal kick counts. She was instructed that the baby should move at a minimum of ten times within one hour after a meal. The patient was instructed to lay down on her left side twenty minutes after eating and count movements for up to one hour with a target value of ten movements. She was instructed to notify the office if she did not make that target after two attempts or if after any attempt there was less than four movements. - Route of delivery and counseling on vaginal, operative vaginal, and  sections were completed with the risks of each to both the patient as well as her baby. The possibility of a blood transfusion was discussed as well. The patient was not opposed to receiving a transfusion if needed. - The patient was counseled on types of analgesia during labor and is considering nitrous oxide, IV Nubain.  - The patient was counseled on the need to choose her pediatrician for her baby. - IOL scheduled for 21 at 10pm.    Patient Active Problem List    Diagnosis Date Noted    Dysmenorrhea 01/10/2022     Desires PP IUD      35 weeks gestation of pregnancy 2022    Elevated 1hr GTT 2021     3 hr GTT ordered      FGR (Dx 11/15/21) 2021     AC<10%ile, Total 48%ile    Per attending, recommend delivery at 37w0d. Per ACOG, window is 34w-37w6      cHTN (no meds) 10/13/2021     New dx. Baseline PreE labs wnl () during ED visit. Baseline P/C 0.13 (10/13).  Chlamydia infection  10/13/2021     Positive 21.  TOR neg 10/13      No prenatal care in current pregnancy in second trimester 10/11/2021     Ga 22w4d at time of ob intake       Obesity 10/11/2021     Early GTT wnl at 22w6d      High-risk pregnancy, second trimester 10/11/2021     10/11/21- [facility] Referral placed for anatomy scan and NIPT. MSAFP ordered. Return in about 1 week (around 1/17/2022) for ANA MARÍA and NST. Marcia Reid DO  Ob/Gyn Resident  [facility], [address]  1/10/2022, 3:28 PM           Attending Physician Statement  I have discussed the care of Luis Jeffers, including pertinent history and exam findings,  with the resident. I have seen and examined the patient and the key elements of all parts of the encounter have been performed by me. I agree with the assessment, plan and orders as documented by the resident.   [facility] Modifier)    Tyrell Slade DO

## 2022-01-13 ENCOUNTER — ROUTINE PRENATAL (OUTPATIENT)
Dept: PERINATAL CARE | Age: 22
End: 2022-01-13
Payer: COMMERCIAL

## 2022-01-13 VITALS
SYSTOLIC BLOOD PRESSURE: 124 MMHG | WEIGHT: 231.04 LBS | BODY MASS INDEX: 38.49 KG/M2 | TEMPERATURE: 97.2 F | DIASTOLIC BLOOD PRESSURE: 83 MMHG | RESPIRATION RATE: 16 BRPM | HEART RATE: 97 BPM | HEIGHT: 65 IN

## 2022-01-13 DIAGNOSIS — Z3A.36 36 WEEKS GESTATION OF PREGNANCY: ICD-10-CM

## 2022-01-13 DIAGNOSIS — O99.810 ABNORMAL MATERNAL GLUCOSE TOLERANCE, ANTEPARTUM: ICD-10-CM

## 2022-01-13 DIAGNOSIS — O41.93X0 ABNORMAL AMNIOTIC FLUID IN THIRD TRIMESTER, SINGLE OR UNSPECIFIED FETUS: ICD-10-CM

## 2022-01-13 DIAGNOSIS — O16.3 HYPERTENSION AFFECTING PREGNANCY IN THIRD TRIMESTER: Primary | ICD-10-CM

## 2022-01-13 DIAGNOSIS — O99.213 OBESITY AFFECTING PREGNANCY IN THIRD TRIMESTER: ICD-10-CM

## 2022-01-13 PROCEDURE — 76820 UMBILICAL ARTERY ECHO: CPT | Performed by: OBSTETRICS & GYNECOLOGY

## 2022-01-13 PROCEDURE — 76819 FETAL BIOPHYS PROFIL W/O NST: CPT | Performed by: OBSTETRICS & GYNECOLOGY

## 2022-01-13 PROCEDURE — 76821 MIDDLE CEREBRAL ARTERY ECHO: CPT | Performed by: OBSTETRICS & GYNECOLOGY

## 2022-01-18 ENCOUNTER — ROUTINE PRENATAL (OUTPATIENT)
Dept: OBGYN | Age: 22
End: 2022-01-18
Payer: COMMERCIAL

## 2022-01-18 VITALS
SYSTOLIC BLOOD PRESSURE: 148 MMHG | HEART RATE: 97 BPM | WEIGHT: 233 LBS | DIASTOLIC BLOOD PRESSURE: 98 MMHG | BODY MASS INDEX: 38.77 KG/M2

## 2022-01-18 DIAGNOSIS — Z34.93 PRENATAL CARE IN THIRD TRIMESTER: Primary | ICD-10-CM

## 2022-01-18 DIAGNOSIS — Z3A.37 37 WEEKS GESTATION OF PREGNANCY: ICD-10-CM

## 2022-01-18 DIAGNOSIS — O10.919 CHRONIC HYPERTENSION AFFECTING PREGNANCY: ICD-10-CM

## 2022-01-18 PROCEDURE — 99211 OFF/OP EST MAY X REQ PHY/QHP: CPT | Performed by: OBSTETRICS & GYNECOLOGY

## 2022-01-18 PROCEDURE — G8417 CALC BMI ABV UP PARAM F/U: HCPCS | Performed by: OBSTETRICS & GYNECOLOGY

## 2022-01-18 PROCEDURE — G8482 FLU IMMUNIZE ORDER/ADMIN: HCPCS | Performed by: OBSTETRICS & GYNECOLOGY

## 2022-01-18 PROCEDURE — 59025 FETAL NON-STRESS TEST: CPT | Performed by: STUDENT IN AN ORGANIZED HEALTH CARE EDUCATION/TRAINING PROGRAM

## 2022-01-18 PROCEDURE — 1036F TOBACCO NON-USER: CPT | Performed by: OBSTETRICS & GYNECOLOGY

## 2022-01-18 PROCEDURE — 99213 OFFICE O/P EST LOW 20 MIN: CPT | Performed by: OBSTETRICS & GYNECOLOGY

## 2022-01-18 PROCEDURE — G8427 DOCREV CUR MEDS BY ELIG CLIN: HCPCS | Performed by: OBSTETRICS & GYNECOLOGY

## 2022-01-18 PROCEDURE — 59025 FETAL NON-STRESS TEST: CPT | Performed by: OBSTETRICS & GYNECOLOGY

## 2022-01-18 NOTE — PROGRESS NOTES
Prenatal Visit    Ulysses Frisk is a 22 y.o. female  at 41w0d    The patient was seen and evaluated. The patient denies any complaints. There was positive fetal movements. She denies contractions, vaginal bleeding and leakage of fluid. Signs and symptoms of labor were reviewed. The S/S of Pre-Eclampsia were reviewed with the patient in detail. She is to report any of these if they occur. She currently denies any signs or symptoms of pre-eclampsia which include headache, vision changes, RUQ pain. The patient already received the T-Dap Vaccine (27-36 weeks) this pregnancy on . The patient is Rh positive and Rhogam is not indicated in this pregnancy. The patient already received the influenza vaccine this year 10/13/21. The patient requested the COVID-19 vaccine this year. Allergies:  Patient has no known allergies. Vitals and physical exam:    BP: (!) 148/98  Weight: 233 lb (105.7 kg)  Pulse: 97  Patient Position: Sitting  Albumin: 2+  Glucose: Negative     NST:  Baseline: 150  Variability: moderate  Accelerations: present  Decelerations: absent  Tribune: rare contractions, patient denies feeling any contractions    The tracing is Category 1,  and the tracing has been reviewed and is considered reactive. Labs:  Group Beta Strep RV culture: negative. Sensitivities for clindamycin and erythromycin: N/A    Assessment & Plan: Ulysses Frisk is a 22 y.o. female  at 41w0d   - GBS RV culture: negative, sensitivities for clindamycin and erythromycin were not ordered   - Tdap vaccination: discussed recommendations for TDAP immunization, patient already received 21.    - Influenza vaccination: 10/13/21   - Rhogam: not indicated   - COVID-19 vaccination: R/B/A discussed with increased risk of both maternal and fetal morbidity and mortality in unvaccinated pregnant patients who contract COVID-19- patient is requesting today   -  testing indication: cHTN (no meds) - scheduled for weekly BPP and doppplers at [facility] and office NSTs    - Warning signs of  labor, pre-eclampsia, decreased fetal movement and recommendations when to call or present to the hospital reviewed   - Route of delivery and counseling on vaginal, operative vaginal, and  sections were completed with the risks of each to both the patient as well as her baby. The possibility of a blood transfusion was discussed as well. The patient was not opposed to receiving a transfusion if needed. - The patient was counseled on types of analgesia during labor and is undecided. - The patient was counseled on the need to choose her pediatrician for her baby. - The patient was counseled on postpartum plans for dysmenorrhea , she is considering IUD. - Previously scheduled for IOL on  at [facility] (no meds)  - Elevated, non severe BP today  - Denies any s/s of PreE  - Scheduled for IOL at 38w0d    Patient Active Problem List    Diagnosis Date Noted    Dysmenorrhea 01/10/2022     Desires PP IUD      35 weeks gestation of pregnancy 2022    Elevated 1hr GTT 2021     3 hr GTT ordered      FGR (Dx 11/15/21) 2021     AC<10%ile, Total 48%ile    Per attending, recommend delivery at 37w0d. Per ACOG, window is 34w-37w6      cHTN (no meds) 10/13/2021     New dx. Baseline PreE labs wnl () during ED visit. Baseline P/C 0.13 (10/13).  Chlamydia infection  10/13/2021     Positive 21. TOR neg 10/13      No prenatal care in current pregnancy in second trimester 10/11/2021     Ga 22w4d at time of ob intake       Obesity 10/11/2021     Early GTT wnl at 22w6d      High-risk pregnancy, second trimester 10/11/2021     10/11/21- [facility] Referral placed for anatomy scan and NIPT. MSAFP ordered. Return in about 3 weeks (around 2022), or 1 week Postpartum visit.     Israel Frye DO  Ob/Gyn Resident  [facility] [place] [facility], [address]  2022, 12:11 PM         Attending Physician Statement  I have discussed the care of Nathalie Alvarado, including pertinent history and exam findings,  with the resident. I have reviewed the key elements of all parts of the encounter with the resident. I agree with the assessment, plan and orders as documented by the resident.   (GE Modifier)      Yuliana Jimenes, DO

## 2022-01-20 ENCOUNTER — ROUTINE PRENATAL (OUTPATIENT)
Dept: PERINATAL CARE | Age: 22
End: 2022-01-20
Payer: COMMERCIAL

## 2022-01-20 VITALS
RESPIRATION RATE: 16 BRPM | DIASTOLIC BLOOD PRESSURE: 86 MMHG | TEMPERATURE: 97.6 F | WEIGHT: 233 LBS | HEART RATE: 96 BPM | HEIGHT: 65 IN | SYSTOLIC BLOOD PRESSURE: 126 MMHG | BODY MASS INDEX: 38.82 KG/M2

## 2022-01-20 DIAGNOSIS — Z3A.37 37 WEEKS GESTATION OF PREGNANCY: ICD-10-CM

## 2022-01-20 DIAGNOSIS — O99.213 OBESITY AFFECTING PREGNANCY IN THIRD TRIMESTER: ICD-10-CM

## 2022-01-20 DIAGNOSIS — O16.3 HYPERTENSION AFFECTING PREGNANCY IN THIRD TRIMESTER: Primary | ICD-10-CM

## 2022-01-20 DIAGNOSIS — O99.810 ABNORMAL MATERNAL GLUCOSE TOLERANCE, ANTEPARTUM: ICD-10-CM

## 2022-01-20 DIAGNOSIS — O41.93X0 ABNORMAL AMNIOTIC FLUID IN THIRD TRIMESTER, SINGLE OR UNSPECIFIED FETUS: ICD-10-CM

## 2022-01-20 PROCEDURE — 76819 FETAL BIOPHYS PROFIL W/O NST: CPT | Performed by: OBSTETRICS & GYNECOLOGY

## 2022-01-20 PROCEDURE — 99999 PR OFFICE/OUTPT VISIT,PROCEDURE ONLY: CPT | Performed by: OBSTETRICS & GYNECOLOGY

## 2022-01-20 PROCEDURE — 76820 UMBILICAL ARTERY ECHO: CPT | Performed by: OBSTETRICS & GYNECOLOGY

## 2022-01-20 PROCEDURE — 76821 MIDDLE CEREBRAL ARTERY ECHO: CPT | Performed by: OBSTETRICS & GYNECOLOGY

## 2022-01-25 ENCOUNTER — HOSPITAL ENCOUNTER (INPATIENT)
Age: 22
LOS: 4 days | Discharge: HOME | DRG: 540 | End: 2022-01-29
Attending: OBSTETRICS & GYNECOLOGY | Admitting: OBSTETRICS & GYNECOLOGY
Payer: COMMERCIAL

## 2022-01-25 ENCOUNTER — APPOINTMENT (OUTPATIENT)
Dept: LABOR AND DELIVERY | Age: 22
DRG: 540 | End: 2022-01-25
Payer: COMMERCIAL

## 2022-01-25 PROBLEM — Z3A.38 38 WEEKS GESTATION OF PREGNANCY: Status: ACTIVE | Noted: 2022-01-25

## 2022-01-25 LAB
ABSOLUTE EOS #: 0.1 K/UL (ref 0–0.44)
ABSOLUTE IMMATURE GRANULOCYTE: 0.07 K/UL (ref 0–0.3)
ABSOLUTE LYMPH #: 2.04 K/UL (ref 1.2–5.2)
ABSOLUTE MONO #: 1.34 K/UL (ref 0.1–1.4)
BASOPHILS # BLD: 0 % (ref 0–2)
BASOPHILS ABSOLUTE: 0.03 K/UL (ref 0–0.2)
DIFFERENTIAL TYPE: ABNORMAL
EOSINOPHILS RELATIVE PERCENT: 1 % (ref 1–4)
HCT VFR BLD CALC: 32.8 % (ref 36.3–47.1)
HEMOGLOBIN: 10.8 G/DL (ref 11.9–15.1)
IMMATURE GRANULOCYTES: 1 %
LYMPHOCYTES # BLD: 19 % (ref 25–45)
MCH RBC QN AUTO: 29 PG (ref 25.2–33.5)
MCHC RBC AUTO-ENTMCNC: 32.9 G/DL (ref 28.4–34.8)
MCV RBC AUTO: 87.9 FL (ref 82.6–102.9)
MONOCYTES # BLD: 13 % (ref 2–8)
NRBC AUTOMATED: 0 PER 100 WBC
PDW BLD-RTO: 14.2 % (ref 11.8–14.4)
PLATELET # BLD: 287 K/UL (ref 138–453)
PLATELET ESTIMATE: ABNORMAL
PMV BLD AUTO: 12.3 FL (ref 8.1–13.5)
RBC # BLD: 3.73 M/UL (ref 3.95–5.11)
RBC # BLD: ABNORMAL 10*6/UL
SEG NEUTROPHILS: 66 % (ref 34–64)
SEGMENTED NEUTROPHILS ABSOLUTE COUNT: 7.16 K/UL (ref 1.8–8)
WBC # BLD: 10.7 K/UL (ref 4.5–13.5)
WBC # BLD: ABNORMAL 10*3/UL

## 2022-01-25 PROCEDURE — 86900 BLOOD TYPING SEROLOGIC ABO: CPT

## 2022-01-25 PROCEDURE — 86850 RBC ANTIBODY SCREEN: CPT

## 2022-01-25 PROCEDURE — 80307 DRUG TEST PRSMV CHEM ANLYZR: CPT

## 2022-01-25 PROCEDURE — 85025 COMPLETE CBC W/AUTO DIFF WBC: CPT

## 2022-01-25 PROCEDURE — 2580000003 HC RX 258

## 2022-01-25 PROCEDURE — 86901 BLOOD TYPING SEROLOGIC RH(D): CPT

## 2022-01-25 PROCEDURE — 84156 ASSAY OF PROTEIN URINE: CPT

## 2022-01-25 PROCEDURE — 1220000000 HC SEMI PRIVATE OB R&B

## 2022-01-25 PROCEDURE — 86780 TREPONEMA PALLIDUM: CPT

## 2022-01-25 PROCEDURE — 82570 ASSAY OF URINE CREATININE: CPT

## 2022-01-25 PROCEDURE — 80053 COMPREHEN METABOLIC PANEL: CPT

## 2022-01-25 RX ORDER — ONDANSETRON 2 MG/ML
4 INJECTION INTRAMUSCULAR; INTRAVENOUS EVERY 6 HOURS PRN
Status: DISCONTINUED | OUTPATIENT
Start: 2022-01-25 | End: 2022-01-27 | Stop reason: HOSPADM

## 2022-01-25 RX ORDER — SODIUM CHLORIDE, SODIUM LACTATE, POTASSIUM CHLORIDE, AND CALCIUM CHLORIDE .6; .31; .03; .02 G/100ML; G/100ML; G/100ML; G/100ML
1000 INJECTION, SOLUTION INTRAVENOUS PRN
Status: DISCONTINUED | OUTPATIENT
Start: 2022-01-25 | End: 2022-01-27 | Stop reason: HOSPADM

## 2022-01-25 RX ORDER — SODIUM CHLORIDE 0.9 % (FLUSH) 0.9 %
5-40 SYRINGE (ML) INJECTION EVERY 12 HOURS SCHEDULED
Status: DISCONTINUED | OUTPATIENT
Start: 2022-01-25 | End: 2022-01-27 | Stop reason: HOSPADM

## 2022-01-25 RX ORDER — SODIUM CHLORIDE 9 MG/ML
25 INJECTION, SOLUTION INTRAVENOUS PRN
Status: DISCONTINUED | OUTPATIENT
Start: 2022-01-25 | End: 2022-01-27 | Stop reason: HOSPADM

## 2022-01-25 RX ORDER — SODIUM CHLORIDE, SODIUM LACTATE, POTASSIUM CHLORIDE, AND CALCIUM CHLORIDE .6; .31; .03; .02 G/100ML; G/100ML; G/100ML; G/100ML
500 INJECTION, SOLUTION INTRAVENOUS PRN
Status: DISCONTINUED | OUTPATIENT
Start: 2022-01-25 | End: 2022-01-27 | Stop reason: HOSPADM

## 2022-01-25 RX ORDER — SODIUM CHLORIDE 0.9 % (FLUSH) 0.9 %
5-40 SYRINGE (ML) INJECTION PRN
Status: DISCONTINUED | OUTPATIENT
Start: 2022-01-25 | End: 2022-01-27 | Stop reason: HOSPADM

## 2022-01-25 RX ORDER — ACETAMINOPHEN 500 MG
1000 TABLET ORAL EVERY 6 HOURS PRN
Status: DISCONTINUED | OUTPATIENT
Start: 2022-01-25 | End: 2022-01-27 | Stop reason: HOSPADM

## 2022-01-25 RX ORDER — SODIUM CHLORIDE, SODIUM LACTATE, POTASSIUM CHLORIDE, CALCIUM CHLORIDE 600; 310; 30; 20 MG/100ML; MG/100ML; MG/100ML; MG/100ML
INJECTION, SOLUTION INTRAVENOUS CONTINUOUS
Status: DISCONTINUED | OUTPATIENT
Start: 2022-01-25 | End: 2022-01-27

## 2022-01-25 RX ORDER — LIDOCAINE HYDROCHLORIDE 10 MG/ML
30 INJECTION, SOLUTION EPIDURAL; INFILTRATION; INTRACAUDAL; PERINEURAL PRN
Status: DISCONTINUED | OUTPATIENT
Start: 2022-01-25 | End: 2022-01-27 | Stop reason: HOSPADM

## 2022-01-25 RX ADMIN — SODIUM CHLORIDE, POTASSIUM CHLORIDE, SODIUM LACTATE AND CALCIUM CHLORIDE: 600; 310; 30; 20 INJECTION, SOLUTION INTRAVENOUS at 23:21

## 2022-01-25 ASSESSMENT — PAIN SCALES - GENERAL: PAINLEVEL_OUTOF10: 0

## 2022-01-26 LAB
ABO/RH: NORMAL
ALBUMIN SERPL-MCNC: 3.5 G/DL (ref 3.5–5.2)
ALBUMIN/GLOBULIN RATIO: 0.9 (ref 1–2.5)
ALP BLD-CCNC: 236 U/L (ref 35–104)
ALT SERPL-CCNC: 20 U/L (ref 5–33)
AMPHETAMINE SCREEN URINE: NEGATIVE
ANION GAP SERPL CALCULATED.3IONS-SCNC: 11 MMOL/L (ref 9–17)
ANTIBODY SCREEN: NEGATIVE
ARM BAND NUMBER: NORMAL
AST SERPL-CCNC: 31 U/L
BARBITURATE SCREEN URINE: NEGATIVE
BENZODIAZEPINE SCREEN, URINE: NEGATIVE
BILIRUB SERPL-MCNC: 0.23 MG/DL (ref 0.3–1.2)
BUN BLDV-MCNC: 4 MG/DL (ref 6–20)
BUN/CREAT BLD: ABNORMAL (ref 9–20)
BUPRENORPHINE URINE: NORMAL
CALCIUM SERPL-MCNC: 9.3 MG/DL (ref 8.6–10.4)
CANNABINOID SCREEN URINE: NEGATIVE
CHLORIDE BLD-SCNC: 103 MMOL/L (ref 98–107)
CO2: 21 MMOL/L (ref 20–31)
COCAINE METABOLITE, URINE: NEGATIVE
CREAT SERPL-MCNC: 0.52 MG/DL (ref 0.5–0.9)
CREATININE URINE: 278.2 MG/DL (ref 28–217)
EXPIRATION DATE: NORMAL
GFR AFRICAN AMERICAN: >60 ML/MIN
GFR NON-AFRICAN AMERICAN: >60 ML/MIN
GFR SERPL CREATININE-BSD FRML MDRD: ABNORMAL ML/MIN/{1.73_M2}
GFR SERPL CREATININE-BSD FRML MDRD: ABNORMAL ML/MIN/{1.73_M2}
GLUCOSE BLD-MCNC: 113 MG/DL (ref 70–99)
MDMA URINE: NORMAL
METHADONE SCREEN, URINE: NEGATIVE
METHAMPHETAMINE, URINE: NORMAL
OPIATES, URINE: NEGATIVE
OXYCODONE SCREEN URINE: NEGATIVE
PHENCYCLIDINE, URINE: NEGATIVE
POTASSIUM SERPL-SCNC: 3.4 MMOL/L (ref 3.7–5.3)
PROPOXYPHENE, URINE: NORMAL
SARS-COV-2, RAPID: NOT DETECTED
SODIUM BLD-SCNC: 135 MMOL/L (ref 135–144)
SPECIMEN DESCRIPTION: NORMAL
T. PALLIDUM, IGG: NONREACTIVE
TEST INFORMATION: NORMAL
TOTAL PROTEIN, URINE: 388 MG/DL
TOTAL PROTEIN: 7.2 G/DL (ref 6.4–8.3)
TRICYCLIC ANTIDEPRESSANTS, UR: NORMAL
URINE TOTAL PROTEIN CREATININE RATIO: 1.39 (ref 0–0.2)

## 2022-01-26 PROCEDURE — 2580000003 HC RX 258

## 2022-01-26 PROCEDURE — 6360000002 HC RX W HCPCS

## 2022-01-26 PROCEDURE — 1220000000 HC SEMI PRIVATE OB R&B

## 2022-01-26 PROCEDURE — 6370000000 HC RX 637 (ALT 250 FOR IP): Performed by: STUDENT IN AN ORGANIZED HEALTH CARE EDUCATION/TRAINING PROGRAM

## 2022-01-26 PROCEDURE — 96374 THER/PROPH/DIAG INJ IV PUSH: CPT

## 2022-01-26 PROCEDURE — 6360000002 HC RX W HCPCS: Performed by: STUDENT IN AN ORGANIZED HEALTH CARE EDUCATION/TRAINING PROGRAM

## 2022-01-26 PROCEDURE — 3E0P7VZ INTRODUCTION OF HORMONE INTO FEMALE REPRODUCTIVE, VIA NATURAL OR ARTIFICIAL OPENING: ICD-10-PCS | Performed by: OBSTETRICS & GYNECOLOGY

## 2022-01-26 PROCEDURE — 87635 SARS-COV-2 COVID-19 AMP PRB: CPT

## 2022-01-26 PROCEDURE — 3E0DXGC INTRODUCTION OF OTHER THERAPEUTIC SUBSTANCE INTO MOUTH AND PHARYNX, EXTERNAL APPROACH: ICD-10-PCS | Performed by: OBSTETRICS & GYNECOLOGY

## 2022-01-26 PROCEDURE — 59200 INSERT CERVICAL DILATOR: CPT

## 2022-01-26 PROCEDURE — 96372 THER/PROPH/DIAG INJ SC/IM: CPT

## 2022-01-26 PROCEDURE — 3E033VJ INTRODUCTION OF OTHER HORMONE INTO PERIPHERAL VEIN, PERCUTANEOUS APPROACH: ICD-10-PCS | Performed by: OBSTETRICS & GYNECOLOGY

## 2022-01-26 PROCEDURE — 6370000000 HC RX 637 (ALT 250 FOR IP)

## 2022-01-26 RX ORDER — MORPHINE SULFATE 2 MG/ML
2 INJECTION, SOLUTION INTRAMUSCULAR; INTRAVENOUS ONCE
Status: COMPLETED | OUTPATIENT
Start: 2022-01-26 | End: 2022-01-26

## 2022-01-26 RX ORDER — PROMETHAZINE HYDROCHLORIDE 25 MG/ML
25 INJECTION, SOLUTION INTRAMUSCULAR; INTRAVENOUS ONCE
Status: CANCELLED | OUTPATIENT
Start: 2022-01-26

## 2022-01-26 RX ORDER — MORPHINE SULFATE 10 MG/ML
10 INJECTION, SOLUTION INTRAMUSCULAR; INTRAVENOUS ONCE
Status: CANCELLED | OUTPATIENT
Start: 2022-01-26

## 2022-01-26 RX ORDER — MORPHINE SULFATE 10 MG/ML
8 INJECTION, SOLUTION INTRAMUSCULAR; INTRAVENOUS ONCE
Status: COMPLETED | OUTPATIENT
Start: 2022-01-26 | End: 2022-01-26

## 2022-01-26 RX ORDER — MORPHINE SULFATE 10 MG/ML
10 INJECTION, SOLUTION INTRAMUSCULAR; INTRAVENOUS ONCE
Status: COMPLETED | OUTPATIENT
Start: 2022-01-26 | End: 2022-01-26

## 2022-01-26 RX ORDER — PROMETHAZINE HYDROCHLORIDE 25 MG/ML
25 INJECTION, SOLUTION INTRAMUSCULAR; INTRAVENOUS ONCE
Status: COMPLETED | OUTPATIENT
Start: 2022-01-26 | End: 2022-01-26

## 2022-01-26 RX ADMIN — Medication 25 MCG: at 12:10

## 2022-01-26 RX ADMIN — DINOPROSTONE 10 MG: 10 INSERT VAGINAL at 00:18

## 2022-01-26 RX ADMIN — SODIUM CHLORIDE, POTASSIUM CHLORIDE, SODIUM LACTATE AND CALCIUM CHLORIDE: 600; 310; 30; 20 INJECTION, SOLUTION INTRAVENOUS at 14:05

## 2022-01-26 RX ADMIN — Medication 25 MCG: at 16:46

## 2022-01-26 RX ADMIN — MORPHINE SULFATE 8 MG: 10 INJECTION INTRAVENOUS at 22:17

## 2022-01-26 RX ADMIN — PROMETHAZINE HYDROCHLORIDE 25 MG: 25 INJECTION INTRAMUSCULAR; INTRAVENOUS at 18:27

## 2022-01-26 RX ADMIN — PROMETHAZINE HYDROCHLORIDE 25 MG: 25 INJECTION INTRAMUSCULAR; INTRAVENOUS at 22:18

## 2022-01-26 RX ADMIN — MORPHINE SULFATE 2 MG: 2 INJECTION, SOLUTION INTRAMUSCULAR; INTRAVENOUS at 21:44

## 2022-01-26 RX ADMIN — MORPHINE SULFATE 10 MG: 10 INJECTION INTRAVENOUS at 18:27

## 2022-01-26 RX ADMIN — SODIUM CHLORIDE, POTASSIUM CHLORIDE, SODIUM LACTATE AND CALCIUM CHLORIDE: 600; 310; 30; 20 INJECTION, SOLUTION INTRAVENOUS at 06:22

## 2022-01-26 ASSESSMENT — PAIN SCALES - GENERAL
PAINLEVEL_OUTOF10: 4
PAINLEVEL_OUTOF10: 5
PAINLEVEL_OUTOF10: 5

## 2022-01-26 NOTE — PROGRESS NOTES
Labor Progress Note    Josiah Trimble is a 22 y.o. female  at 43w4d  The patient was seen and examined. She is resting comfortably in bed. Her pain is well controlled. She reports fetal movement is present, complains of contractions, denies loss of fluid, denies vaginal bleeding. Vital Signs:  Vitals:    22 2258 22 0023 22 0125 22 0200   BP: (!) 145/94 (!) 150/93 (!) 147/98    Pulse: 102 85 80    Resp: 18 16 18    Temp: 98.4 °F (36.9 °C)   98.4 °F (36.9 °C)   TempSrc: Oral   Oral   SpO2: 97%      Weight:       Height:         FHT: 140, moderate variability, accelerations present, decelerations absent  Contractions: rare    Chaperone for Intimate Exam: NA  Cervical Exam: deferred  Pitocin: @ 0 mu/min    Membranes: Intact  Scalp Electrode in place: absent  Intrauterine Pressure Catheter in Place: absent    Interventions: none    Assessment/Plan:   Josiah Trimble is a 22 y.o. female  at 43w4d admitted for IOL 2/2 cHTN (no meds)   - GBS negative, No indication for GBS prophylaxis   - Afebrile   - cEFM/TOCO   - Cervidil in place, out @ 0018   - Continue to monitor closely and anticipate vaginal delivery    cHTN (no meds)   - BP elevated but non severe range since admission   - Denies any s/s PreE   - PreE labs wnl on admission, P/C pending   - Will continue to monitor closely     FGR (AC <3% tile)   - Noted on MFM US on 21     Elv 1 hr, no 3 hr gtt   - POCT glucose on admission was 113    Hx Chlamydia   - TOR neg    Anemia   - Hgb on admission was 10.8   - Patient clinically asymptomatic   - Continue to monitor closely     Late/insufficient prenatal care   - SW consult PP    Dysmenorrhea   - Desires post-placental Mirena IUD   - Consent/orders done    Kathy Alvarado MD  Ob/Gyn Resident  2022, 4:35 AM

## 2022-01-26 NOTE — DISCHARGE SUMMARY
Obstetric Discharge Summary  [facility]    Patient Name: Pati Vega  Patient : 2000  Primary Care Physician: Gael Dominguez Physician  Admit Date: 2022    Principal Diagnosis: IUP at 38w0d, admitted for Induction of Labor 2/2 cHTN (no meds)    Her pregnancy has been complicated by:   Patient Active Problem List   Diagnosis    No prenatal care in current pregnancy in second trimester    Obesity    cHTN (no meds) w/ BUTCH w/o SF    Chlamydia infection     FGR (Dx 11/15/21)    Elevated 1hr GTT    Dysmenorrhea    Chorioamnionitis (G1)    Arrest of descent (G1)    PLTCS 22 M Apg 1/3/3/3/7 Wt 6#5      Infection Present?: No  Hospital Acquired: No    Surgical Operations & Procedures:  Analgesia: epidural and spinal  Delivery Type:  Delivery: See Labor and Delivery Summary   Laceration(s): Absent    Consultations: MFM, NICU and Anesthesia    Pertinent Findings & Procedures: Pati Vega is a 22 y.o. female  at 38w0d admitted for IOL 2/2 chronic hypertension on no medications. During her admission, she met criteria for chronic hypertension on no medications with BUTCH without severe features due to normal PreE labs and an abnormal P/C of 1.39. She received Cervidil, Cytotec 25 PO x2, Morphine/Phenergan x3, Cooks balloon, pitocin, and SROM @ 0600. The patient was noted to be complete and pushed for 2 hours with limited fetal descent. The decision was made to proceed with a  section secondary to suspected cephalopelvic disproportion with arrest of fetal descent. Pt met criteria for chorioamnionitis and given Amp/Gent/Clinda x1.      She delivered by primary low transverse  a Live Born infant on 22.        Information for the patient's :  [facility] [[id number]   male   Birth Weight: 6 lb 5.2 oz (2.87 kg)       Apgars: 1 at 1 minute, and 3 at 5 minutes, 3 at 10 minute, and 3 at 15 minutes, and 7 at 20 capsule  · ferrous sulfate 325 (65 Fe) MG tablet  · ibuprofen 600 MG tablet  · NIFEdipine 30 MG extended release tablet  · oxyCODONE 5 MG immediate release tablet           Activity: pelvic rest x 6 weeks, no driving on narcotics, no lifting greater than 15 lbs  Diet: regular diet  Follow up: 1 week BP check and silver dressing removal    Condition on discharge: stable    Discharge date: 1/29/22    Bernadette Howard DO  Ob/Gyn Resident    Comments:  Home care and follow-up care were reviewed. Pelvic rest, and birth control were reviewed. Signs and symptoms of mastitis and post partum depression were reviewed. The patient is to notify her physician if any of these occur. The patient was counseled on secondary smoke risks and the increased risk of sudden infant death syndrome and respiratory problems to her baby with exposure. She was counseled on various alternate recommendations to decrease the exposure to secondary smoke to her children.

## 2022-01-26 NOTE — PROGRESS NOTES
Labor Progress Note    Richie Prado is a 22 y.o. female  at 43w4d  The patient was seen and examined. Her pain is well controlled but she has difficulty tolerating cervical exams. She reports fetal movement is present, complains of contractions, denies loss of fluid, denies vaginal bleeding. Cervidil removed. Cytotec 25 PO x1 ordered. Vital Signs:  Vitals:    22 0125 22 0200 22 0447 22 0900   BP: (!) 147/98  133/83 (!) 141/95   Pulse: 80  91 93   Resp: 18  18 18   Temp:  98.4 °F (36.9 °C) 98.4 °F (36.9 °C) 98.2 °F (36.8 °C)   TempSrc:  Oral Oral Oral   SpO2:    98%   Weight:       Height:             FHT: 150, moderate variability, accelerations present, decelerations absent  Contractions: difficult to trace but patient reports feeling contractions q4-5min    Chaperone for Intimate Exam: Chaperone was present for entire exam, Chaperone Name: Magda Andino RN   Cervical Exam: 1 cm dilated, 60 effaced, -3 station  Pitocin: @ 0 mu/min    Membranes: Intact  Scalp Electrode in place: absent  Intrauterine Pressure Catheter in Place: absent    Interventions: Cervidil removed    Assessment/Plan: Richie Prado is a 22 y.o. female  at 43w4d admitted for IOL 2/2 cHTN on no medications   - GBS negative, No indication for GBS prophylaxis   - SVE: 1, 60, -2   - S/p Cervidil x1    - Cytotec 25 PO x1 ordered   - Will continue to monitor closely   - BP elevated but non severe    Attending updated and in agreement with plan.      London Bryant DO  Ob/Gyn Resident  2022, 11:54 AM

## 2022-01-26 NOTE — CARE COORDINATION
Initial Transitional Care Planning Note:     Wil Rdz is a 22 y.o.  at 38w0d who presents for a scheduled induction of labor due to [facility]     Case management will continue to follow throughout stay. Will meet with mom after delivery to assess for DC needs.      Anticipate a  with a 2 day length of stay

## 2022-01-26 NOTE — FLOWSHEET NOTE
Pt presents to [facility], accompanied by Pippa SIMON), via wheelchair. Pt here for scheduled induction. Pt gowned and in bed, oriented to room and call light. EFM explained and applied. FHT's assuring. Dr. Roshan Choi notified of admission.

## 2022-01-26 NOTE — H&P
pain, negative myalgias, negative arthralgias  Neurological:  negative dizziness, negative weakness  Behavior/Psych: negative depression, negative anxiety    OBSTETRICAL HISTORY:   OB History    Para Term  AB Living   1 0 0 0 0 0   SAB IAB Ectopic Molar Multiple Live Births   0 0 0 0 0 0      # Outcome Date GA Lbr Andrew/2nd Weight Sex Delivery Anes PTL Lv   1 Current                PAST MEDICAL HISTORY:   has a past medical history of Chronic HTN and Obese. PAST SURGICAL HISTORY:   has no past surgical history on file. ALLERGIES:  has No Known Allergies. MEDICATIONS:  Prior to Admission medications    Medication Sig Start Date End Date Taking? Authorizing Provider   aspirin EC 81 MG EC tablet Take 1 tablet by mouth daily 10/13/21  Yes Marcela Caicedo DO   Prenatal Vit-Fe Fumarate-FA (PRENATAL VITAMIN) 27-1 MG TABS tablet Take 1 tablet by mouth daily May substitute with any prenatal vit pt insurance will cover 10/11/21  Yes Juliana Schaeffer MD   famotidine (PEPCID) 20 MG tablet Take 1 tablet by mouth 2 times daily 21   Bernard Most, DO       FAMILY HISTORY:  family history includes Diabetes in her paternal grandmother; No Known Problems in her brother, father, maternal grandfather, maternal grandmother, mother, paternal grandfather, and sister. SOCIAL HISTORY:   reports that she has never smoked. She has never used smokeless tobacco. She reports previous alcohol use. She reports that she does not use drugs. VITALS:  There were no vitals filed for this visit.     PHYSICAL EXAM:  Fetal Heart Monitor:  Baseline Heart Rate 150, moderate variability, present accelerations, absent decelerations  [place]: rare contractions    General appearance:  no apparent distress, alert, and cooperative  HEENT: head atraumatic, normocephalic, moist mucous membranes, trachea midline  Neurologic:  alert, oriented, normal speech, no focal findings or movement disorder noted  Lungs:  No increased work of breathing noted  Heart:  regular rate and rhythm and no murmur    Abdomen:  soft, gravid, and non-tender  Extremities:  no calf tenderness, non edematous   Musculoskeletal: Gross strength equal and intact throughout, no gross abnormalities, range of motion normal in hips, knees, shoulders and spine  Psychiatric: Mood appropriate, normal affect   Rectal Exam: not indicated  Pelvic Exam:  Chaperone for Intimate Exam: Chaperone was present for entire exam, Chaperone Name: Walter King RN  Sterile Vaginal Exam:  Cervix: No cervical motion tenderness   Uterus: Is gravid, Normal size, shape, consistency and non-tender  Cervix: fingertip dilated, 0 % effaced, -3 station, posterior position (out of 3 station), firm consistency, FETAL POSITION: Cephalic (confirmed by ultrasound), Membranes intact,    Bishops Score: 0     0 1 2 3   Position Posterior Intermediate Anterior -   Consistency Firm Intermediate Soft -   Effacement 0-30% 31-50% 51-80% >80%   Dilation 0cm 1-2cm 3-4cm >5cm   Fetal Station -3 -2 -1, 0 +1, +2     LIMITED BEDSIDE US:  Position: Cephalic  Placental Location: anterior  Fetal Heart Tones: Present  Fetal Movement: Present  Amniotic Fluid Index/Volume: adequate 2x2 cm  Estimated Fetal Weight:  6 lbs 8oz    PRENATAL LAB RESULTS:  Blood Type/Rh: O pos  Antibody Screen: negative  Hemoglobin, Hematocrit, Platelets: Hgb 45.8/EPE 37.7/Plt 288  Rubella: immune  T.  Pallidum, IgG: non-reactive  Hepatitis B Surface Antigen: non-reactive   Hepatitis C Antibody: non-reactive   HIV: non-reactive   Sickle Cell Screen: normal pattern on Hgb electroporesis  Gonorrhea: negative  Chlamydia: positive on 6/13/21, negative 10/13/21 & 1/5/22  Urine culture: negative, date: 1/5/22    Early 1 hour Glucose Tolerance Test: 101  1 hour Glucose Tolerance Test: 154  3 hour Glucose Tolerance Test: not done    Group B Strep: negative RV culture on 1/5/22  Cystic Fibrosis Screen: negative  First Trimester Screen: not available  MSAFP/Multiple Markers: not available  Non-Invasive Prenatal Testing: low risk for aneuploidy  Anatomy US: anterior placenta, 3VC, male gender, normal anatomy    ASSESSMENT & PLAN:  Leora Navarrete is a 22 y.o. female  at 38w0d IUP    - GBS negative/ Rh positive / R immune   - No indication for GBS prophylaxis     IOL 2/2 cHTN (no meds)   - Admit to labor and delivery under the service of Dr. Digna Pemberton   - Afebrile   - BP elevated on admission   - cEFM and TOCO   - Cat 1 FHT and TOCO showing rare contractions   - CBC, T&S, T.Pal, COVID ordered   - UDS ordered. R/B/A discussed with patient and patient agreeable   - Baseline PreE labs wnl, P/C 0.13 (10/13)   - PreE labs ordered on admission    - Denies s/s PreE   - IVF: LR @ 125 cc/hr    - Plan of Induction: Cervidil     Hx FGR (AC <3 % tile)   - Diagnosed on [facility] US on 21. EFW was 30% tile   - AC 21% tile and EFW 26% tile on MFM US on 22    Late/insufficient PNC    - Intake @ 22w4d   - Initial prenatal @ 23w1d   - Patient has multiple no show appointments at the [address] [place] [address]    Hx Chlamydia (TOR neg)    - Positive on 21   - Negative on 10/13/21 and 22     Elv 1 hr gtt, no 3 hr gtt   - 1 hr gtt was 154   - POCT glucose ordered on admission    Dysmenorrhea    - Desires postplacental Mirena IUD   - Consent/orders done    BMI 38.77  Patient Active Problem List    Diagnosis Date Noted    38 weeks gestation of pregnancy 2022    Dysmenorrhea 01/10/2022     Desires PP IUD      35 weeks gestation of pregnancy 2022    Elevated 1hr GTT 2021     3 hr GTT ordered      FGR (Dx 11/15/21) 2021     AC<10%ile, Total 48%ile    Per attending, recommend delivery at 37w0d. Per ACOG, window is 34w-37w6      cHTN (no meds) 10/13/2021     New dx. Baseline PreE labs wnl () during ED visit. Baseline P/C 0.13 (10/13).  Chlamydia infection  10/13/2021     Positive 21.  TOR neg 10/13      No prenatal care in current pregnancy in second trimester 10/11/2021 Ga 22w4d at time of ob intake       Obesity 10/11/2021     Early GTT wnl at 22w6d      High-risk pregnancy, second trimester 10/11/2021     10/11/21- MFM Referral placed for anatomy scan and NIPT. MSAFP ordered. Plan discussed with Dr. Keon Ellington, who is agreeable. Steroids given this admission: No    Risks, benefits, alternatives and possible complications have been discussed in detail with the patient. Admission, and post admission procedures and expectations were discussed in detail. All questions were answered.     Attending's Name: Dr. Treva Dennis MD  Ob/Gyn Resident  1/25/2022, 10:49 PM

## 2022-01-27 ENCOUNTER — ANESTHESIA (OUTPATIENT)
Dept: LABOR AND DELIVERY | Age: 22
DRG: 540 | End: 2022-01-27
Payer: COMMERCIAL

## 2022-01-27 ENCOUNTER — ANESTHESIA EVENT (OUTPATIENT)
Dept: LABOR AND DELIVERY | Age: 22
DRG: 540 | End: 2022-01-27
Payer: COMMERCIAL

## 2022-01-27 VITALS — SYSTOLIC BLOOD PRESSURE: 140 MMHG | DIASTOLIC BLOOD PRESSURE: 85 MMHG | OXYGEN SATURATION: 97 %

## 2022-01-27 PROBLEM — O41.1290 CHORIOAMNIONITIS: Status: ACTIVE | Noted: 2022-01-27

## 2022-01-27 PROCEDURE — 59050 FETAL MONITOR W/REPORT: CPT

## 2022-01-27 PROCEDURE — 2580000003 HC RX 258

## 2022-01-27 PROCEDURE — 6360000002 HC RX W HCPCS: Performed by: STUDENT IN AN ORGANIZED HEALTH CARE EDUCATION/TRAINING PROGRAM

## 2022-01-27 PROCEDURE — 2500000003 HC RX 250 WO HCPCS: Performed by: NURSE ANESTHETIST, CERTIFIED REGISTERED

## 2022-01-27 PROCEDURE — 96372 THER/PROPH/DIAG INJ SC/IM: CPT

## 2022-01-27 PROCEDURE — 6370000000 HC RX 637 (ALT 250 FOR IP): Performed by: STUDENT IN AN ORGANIZED HEALTH CARE EDUCATION/TRAINING PROGRAM

## 2022-01-27 PROCEDURE — 88307 TISSUE EXAM BY PATHOLOGIST: CPT

## 2022-01-27 PROCEDURE — 3700000025 EPIDURAL BLOCK: Performed by: ANESTHESIOLOGY

## 2022-01-27 PROCEDURE — 2580000003 HC RX 258: Performed by: STUDENT IN AN ORGANIZED HEALTH CARE EDUCATION/TRAINING PROGRAM

## 2022-01-27 PROCEDURE — 7100000001 HC PACU RECOVERY - ADDTL 15 MIN: Performed by: OBSTETRICS & GYNECOLOGY

## 2022-01-27 PROCEDURE — 3609079900 HC CESAREAN SECTION: Performed by: OBSTETRICS & GYNECOLOGY

## 2022-01-27 PROCEDURE — 3700000000 HC ANESTHESIA ATTENDED CARE: Performed by: OBSTETRICS & GYNECOLOGY

## 2022-01-27 PROCEDURE — 3700000001 HC ADD 15 MINUTES (ANESTHESIA): Performed by: OBSTETRICS & GYNECOLOGY

## 2022-01-27 PROCEDURE — 2709999900 HC NON-CHARGEABLE SUPPLY: Performed by: OBSTETRICS & GYNECOLOGY

## 2022-01-27 PROCEDURE — 6360000002 HC RX W HCPCS: Performed by: NURSE ANESTHETIST, CERTIFIED REGISTERED

## 2022-01-27 PROCEDURE — 1220000000 HC SEMI PRIVATE OB R&B

## 2022-01-27 PROCEDURE — 7100000000 HC PACU RECOVERY - FIRST 15 MIN: Performed by: OBSTETRICS & GYNECOLOGY

## 2022-01-27 PROCEDURE — 2580000003 HC RX 258: Performed by: NURSE ANESTHETIST, CERTIFIED REGISTERED

## 2022-01-27 PROCEDURE — 6360000002 HC RX W HCPCS

## 2022-01-27 RX ORDER — POLYETHYLENE GLYCOL 3350 17 G/17G
17 POWDER, FOR SOLUTION ORAL DAILY
Status: DISCONTINUED | OUTPATIENT
Start: 2022-01-28 | End: 2022-01-29 | Stop reason: HOSPADM

## 2022-01-27 RX ORDER — ONDANSETRON 2 MG/ML
4 INJECTION INTRAMUSCULAR; INTRAVENOUS EVERY 6 HOURS PRN
Status: DISCONTINUED | OUTPATIENT
Start: 2022-01-27 | End: 2022-01-27 | Stop reason: HOSPADM

## 2022-01-27 RX ORDER — OXYCODONE HYDROCHLORIDE 5 MG/1
10 TABLET ORAL EVERY 4 HOURS PRN
Status: DISCONTINUED | OUTPATIENT
Start: 2022-01-27 | End: 2022-01-29 | Stop reason: HOSPADM

## 2022-01-27 RX ORDER — LIDOCAINE HYDROCHLORIDE AND EPINEPHRINE 15; 5 MG/ML; UG/ML
INJECTION, SOLUTION EPIDURAL PRN
Status: DISCONTINUED | OUTPATIENT
Start: 2022-01-27 | End: 2022-01-27 | Stop reason: SDUPTHER

## 2022-01-27 RX ORDER — AMPICILLIN 2 G/1
INJECTION, POWDER, FOR SOLUTION INTRAVENOUS
Status: COMPLETED
Start: 2022-01-27 | End: 2022-01-27

## 2022-01-27 RX ORDER — KETOROLAC TROMETHAMINE 30 MG/ML
INJECTION, SOLUTION INTRAMUSCULAR; INTRAVENOUS PRN
Status: DISCONTINUED | OUTPATIENT
Start: 2022-01-27 | End: 2022-01-27 | Stop reason: SDUPTHER

## 2022-01-27 RX ORDER — SODIUM CHLORIDE 9 MG/ML
25 INJECTION, SOLUTION INTRAVENOUS PRN
Status: DISCONTINUED | OUTPATIENT
Start: 2022-01-27 | End: 2022-01-29 | Stop reason: HOSPADM

## 2022-01-27 RX ORDER — ROPIVACAINE HYDROCHLORIDE 2 MG/ML
INJECTION, SOLUTION EPIDURAL; INFILTRATION; PERINEURAL
Status: COMPLETED
Start: 2022-01-27 | End: 2022-01-27

## 2022-01-27 RX ORDER — LANOLIN 72 %
OINTMENT (GRAM) TOPICAL
Status: DISCONTINUED | OUTPATIENT
Start: 2022-01-27 | End: 2022-01-29 | Stop reason: HOSPADM

## 2022-01-27 RX ORDER — CEPHALEXIN 500 MG/1
500 CAPSULE ORAL EVERY 8 HOURS SCHEDULED
Status: DISCONTINUED | OUTPATIENT
Start: 2022-01-28 | End: 2022-01-29 | Stop reason: HOSPADM

## 2022-01-27 RX ORDER — VITAMIN A, ASCORBIC ACID, CHOLECALCIFEROL, .ALPHA.-TOCOPHEROL ACETATE, DL-, THIAMINE MONONITRATE, RIBOFLAVIN, NIACINAMIDE, PYRIDOXINE HYDROCHLORIDE, FOLIC ACID, CYANOCOBALAMIN, CALCIUM CARBONATE, IRON, ZINC OXIDE, AND CUPRIC OXIDE 4000; 120; 400; 22; 1.84; 3; 20; 10; 1; 12; 200; 29; 25; 2 [IU]/1; MG/1; [IU]/1; [IU]/1; MG/1; MG/1; MG/1; MG/1; MG/1; UG/1; MG/1; MG/1; MG/1; MG/1
1 TABLET ORAL DAILY
Status: DISCONTINUED | OUTPATIENT
Start: 2022-01-28 | End: 2022-01-29 | Stop reason: HOSPADM

## 2022-01-27 RX ORDER — ONDANSETRON 2 MG/ML
4 INJECTION INTRAMUSCULAR; INTRAVENOUS EVERY 6 HOURS PRN
Status: DISCONTINUED | OUTPATIENT
Start: 2022-01-27 | End: 2022-01-29 | Stop reason: HOSPADM

## 2022-01-27 RX ORDER — SODIUM CHLORIDE 0.9 % (FLUSH) 0.9 %
10 SYRINGE (ML) INJECTION PRN
Status: DISCONTINUED | OUTPATIENT
Start: 2022-01-27 | End: 2022-01-29 | Stop reason: HOSPADM

## 2022-01-27 RX ORDER — SODIUM CHLORIDE, SODIUM LACTATE, POTASSIUM CHLORIDE, CALCIUM CHLORIDE 600; 310; 30; 20 MG/100ML; MG/100ML; MG/100ML; MG/100ML
INJECTION, SOLUTION INTRAVENOUS CONTINUOUS
Status: DISCONTINUED | OUTPATIENT
Start: 2022-01-27 | End: 2022-01-28

## 2022-01-27 RX ORDER — LIDOCAINE HYDROCHLORIDE 10 MG/ML
INJECTION, SOLUTION EPIDURAL; INFILTRATION; INTRACAUDAL; PERINEURAL PRN
Status: DISCONTINUED | OUTPATIENT
Start: 2022-01-27 | End: 2022-01-27

## 2022-01-27 RX ORDER — IBUPROFEN 600 MG/1
600 TABLET ORAL EVERY 6 HOURS
Status: DISCONTINUED | OUTPATIENT
Start: 2022-01-28 | End: 2022-01-29 | Stop reason: HOSPADM

## 2022-01-27 RX ORDER — ROPIVACAINE HYDROCHLORIDE 2 MG/ML
INJECTION, SOLUTION EPIDURAL; INFILTRATION; PERINEURAL PRN
Status: DISCONTINUED | OUTPATIENT
Start: 2022-01-27 | End: 2022-01-27 | Stop reason: SDUPTHER

## 2022-01-27 RX ORDER — CLINDAMYCIN PHOSPHATE 900 MG/50ML
900 INJECTION INTRAVENOUS EVERY 8 HOURS
Status: COMPLETED | OUTPATIENT
Start: 2022-01-27 | End: 2022-01-28

## 2022-01-27 RX ORDER — NIFEDIPINE 30 MG/1
30 TABLET, EXTENDED RELEASE ORAL DAILY
Status: DISCONTINUED | OUTPATIENT
Start: 2022-01-27 | End: 2022-01-29 | Stop reason: HOSPADM

## 2022-01-27 RX ORDER — MORPHINE SULFATE 10 MG/ML
10 INJECTION, SOLUTION INTRAMUSCULAR; INTRAVENOUS ONCE
Status: COMPLETED | OUTPATIENT
Start: 2022-01-27 | End: 2022-01-27

## 2022-01-27 RX ORDER — SIMETHICONE 80 MG
80 TABLET,CHEWABLE ORAL EVERY 6 HOURS PRN
Status: DISCONTINUED | OUTPATIENT
Start: 2022-01-27 | End: 2022-01-29 | Stop reason: HOSPADM

## 2022-01-27 RX ORDER — NALOXONE HYDROCHLORIDE 0.4 MG/ML
0.4 INJECTION, SOLUTION INTRAMUSCULAR; INTRAVENOUS; SUBCUTANEOUS PRN
Status: DISCONTINUED | OUTPATIENT
Start: 2022-01-27 | End: 2022-01-29 | Stop reason: HOSPADM

## 2022-01-27 RX ORDER — ACETAMINOPHEN 500 MG
1000 TABLET ORAL EVERY 6 HOURS
Status: DISCONTINUED | OUTPATIENT
Start: 2022-01-27 | End: 2022-01-29 | Stop reason: HOSPADM

## 2022-01-27 RX ORDER — NALOXONE HYDROCHLORIDE 0.4 MG/ML
0.4 INJECTION, SOLUTION INTRAMUSCULAR; INTRAVENOUS; SUBCUTANEOUS PRN
Status: DISCONTINUED | OUTPATIENT
Start: 2022-01-27 | End: 2022-01-27 | Stop reason: HOSPADM

## 2022-01-27 RX ORDER — AMPICILLIN 2 G/1
INJECTION, POWDER, FOR SOLUTION INTRAVENOUS PRN
Status: DISCONTINUED | OUTPATIENT
Start: 2022-01-27 | End: 2022-01-27 | Stop reason: SDUPTHER

## 2022-01-27 RX ORDER — NALBUPHINE HCL 10 MG/ML
5 AMPUL (ML) INJECTION EVERY 4 HOURS PRN
Status: DISCONTINUED | OUTPATIENT
Start: 2022-01-27 | End: 2022-01-27 | Stop reason: HOSPADM

## 2022-01-27 RX ORDER — EPHEDRINE SULFATE/0.9% NACL/PF 50 MG/5 ML
10 SYRINGE (ML) INTRAVENOUS PRN
Status: DISCONTINUED | OUTPATIENT
Start: 2022-01-27 | End: 2022-01-27

## 2022-01-27 RX ORDER — SODIUM CHLORIDE, SODIUM LACTATE, POTASSIUM CHLORIDE, CALCIUM CHLORIDE 600; 310; 30; 20 MG/100ML; MG/100ML; MG/100ML; MG/100ML
INJECTION, SOLUTION INTRAVENOUS CONTINUOUS PRN
Status: DISCONTINUED | OUTPATIENT
Start: 2022-01-27 | End: 2022-01-27 | Stop reason: SDUPTHER

## 2022-01-27 RX ORDER — CLINDAMYCIN PHOSPHATE 900 MG/50ML
900 INJECTION INTRAVENOUS EVERY 8 HOURS
Status: DISCONTINUED | OUTPATIENT
Start: 2022-01-27 | End: 2022-01-27

## 2022-01-27 RX ORDER — BISACODYL 10 MG
10 SUPPOSITORY, RECTAL RECTAL DAILY PRN
Status: DISCONTINUED | OUTPATIENT
Start: 2022-01-27 | End: 2022-01-29 | Stop reason: HOSPADM

## 2022-01-27 RX ORDER — AMPICILLIN 2 G/1
INJECTION, POWDER, FOR SOLUTION INTRAVENOUS
Status: DISCONTINUED
Start: 2022-01-27 | End: 2022-01-27 | Stop reason: WASHOUT

## 2022-01-27 RX ORDER — FENTANYL CITRATE 50 UG/ML
INJECTION, SOLUTION INTRAMUSCULAR; INTRAVENOUS PRN
Status: DISCONTINUED | OUTPATIENT
Start: 2022-01-27 | End: 2022-01-27 | Stop reason: SDUPTHER

## 2022-01-27 RX ORDER — METRONIDAZOLE 500 MG/1
500 TABLET ORAL EVERY 8 HOURS SCHEDULED
Status: DISCONTINUED | OUTPATIENT
Start: 2022-01-28 | End: 2022-01-29 | Stop reason: HOSPADM

## 2022-01-27 RX ORDER — LIDOCAINE HYDROCHLORIDE 10 MG/ML
INJECTION, SOLUTION EPIDURAL; INFILTRATION; INTRACAUDAL; PERINEURAL PRN
Status: DISCONTINUED | OUTPATIENT
Start: 2022-01-27 | End: 2022-01-27 | Stop reason: SDUPTHER

## 2022-01-27 RX ORDER — FENTANYL CITRATE 50 UG/ML
INJECTION, SOLUTION INTRAMUSCULAR; INTRAVENOUS PRN
Status: DISCONTINUED | OUTPATIENT
Start: 2022-01-27 | End: 2022-01-27

## 2022-01-27 RX ORDER — ONDANSETRON 2 MG/ML
INJECTION INTRAMUSCULAR; INTRAVENOUS PRN
Status: DISCONTINUED | OUTPATIENT
Start: 2022-01-27 | End: 2022-01-27 | Stop reason: SDUPTHER

## 2022-01-27 RX ORDER — OXYCODONE HYDROCHLORIDE 5 MG/1
5 TABLET ORAL EVERY 4 HOURS PRN
Status: DISCONTINUED | OUTPATIENT
Start: 2022-01-27 | End: 2022-01-29 | Stop reason: HOSPADM

## 2022-01-27 RX ORDER — CARBOPROST TROMETHAMINE 250 UG/ML
INJECTION, SOLUTION INTRAMUSCULAR PRN
Status: DISCONTINUED | OUTPATIENT
Start: 2022-01-27 | End: 2022-01-27 | Stop reason: SDUPTHER

## 2022-01-27 RX ORDER — KETOROLAC TROMETHAMINE 30 MG/ML
30 INJECTION, SOLUTION INTRAMUSCULAR; INTRAVENOUS EVERY 6 HOURS
Status: DISPENSED | OUTPATIENT
Start: 2022-01-27 | End: 2022-01-28

## 2022-01-27 RX ORDER — DIPHENHYDRAMINE HYDROCHLORIDE 50 MG/ML
25 INJECTION INTRAMUSCULAR; INTRAVENOUS EVERY 6 HOURS PRN
Status: DISCONTINUED | OUTPATIENT
Start: 2022-01-27 | End: 2022-01-29 | Stop reason: HOSPADM

## 2022-01-27 RX ORDER — DOCUSATE SODIUM 100 MG/1
100 CAPSULE, LIQUID FILLED ORAL 2 TIMES DAILY
Status: DISCONTINUED | OUTPATIENT
Start: 2022-01-27 | End: 2022-01-29 | Stop reason: HOSPADM

## 2022-01-27 RX ORDER — TRANEXAMIC ACID 100 MG/ML
INJECTION, SOLUTION INTRAVENOUS PRN
Status: DISCONTINUED | OUTPATIENT
Start: 2022-01-27 | End: 2022-01-27 | Stop reason: SDUPTHER

## 2022-01-27 RX ORDER — PROMETHAZINE HYDROCHLORIDE 25 MG/ML
25 INJECTION, SOLUTION INTRAMUSCULAR; INTRAVENOUS ONCE
Status: COMPLETED | OUTPATIENT
Start: 2022-01-27 | End: 2022-01-27

## 2022-01-27 RX ADMIN — TRANEXAMIC ACID 1000 MG: 100 INJECTION, SOLUTION INTRAVENOUS at 15:48

## 2022-01-27 RX ADMIN — LIDOCAINE HYDROCHLORIDE,EPINEPHRINE BITARTRATE 3 ML: 15; .005 INJECTION, SOLUTION EPIDURAL; INFILTRATION; INTRACAUDAL; PERINEURAL at 09:07

## 2022-01-27 RX ADMIN — SODIUM CHLORIDE, POTASSIUM CHLORIDE, SODIUM LACTATE AND CALCIUM CHLORIDE: 600; 310; 30; 20 INJECTION, SOLUTION INTRAVENOUS at 09:00

## 2022-01-27 RX ADMIN — AMPICILLIN SODIUM 2000 MG: 2 INJECTION, POWDER, FOR SOLUTION INTRAMUSCULAR; INTRAVENOUS at 15:53

## 2022-01-27 RX ADMIN — ROPIVACAINE HYDROCHLORIDE 10 MG: 2 INJECTION, SOLUTION EPIDURAL; INFILTRATION at 09:13

## 2022-01-27 RX ADMIN — KETOROLAC TROMETHAMINE 30 MG: 30 INJECTION, SOLUTION INTRAMUSCULAR at 16:35

## 2022-01-27 RX ADMIN — SODIUM CHLORIDE, POTASSIUM CHLORIDE, SODIUM LACTATE AND CALCIUM CHLORIDE: 600; 310; 30; 20 INJECTION, SOLUTION INTRAVENOUS at 15:25

## 2022-01-27 RX ADMIN — ONDANSETRON 4 MG: 2 INJECTION INTRAMUSCULAR; INTRAVENOUS at 16:35

## 2022-01-27 RX ADMIN — PROMETHAZINE HYDROCHLORIDE 25 MG: 25 INJECTION INTRAMUSCULAR; INTRAVENOUS at 03:45

## 2022-01-27 RX ADMIN — GENTAMICIN SULFATE 158.4 MG: 40 INJECTION, SOLUTION INTRAMUSCULAR; INTRAVENOUS at 15:43

## 2022-01-27 RX ADMIN — CARBOPROST TROMETHAMINE 250 MCG: 250 INJECTION, SOLUTION INTRAMUSCULAR at 15:59

## 2022-01-27 RX ADMIN — LIDOCAINE HYDROCHLORIDE,EPINEPHRINE BITARTRATE 3 ML: 15; .005 INJECTION, SOLUTION EPIDURAL; INFILTRATION; INTRACAUDAL; PERINEURAL at 09:10

## 2022-01-27 RX ADMIN — SODIUM CHLORIDE, POTASSIUM CHLORIDE, SODIUM LACTATE AND CALCIUM CHLORIDE: 600; 310; 30; 20 INJECTION, SOLUTION INTRAVENOUS at 05:04

## 2022-01-27 RX ADMIN — AMPICILLIN SODIUM 2000 MG: 2 INJECTION, POWDER, FOR SOLUTION INTRAMUSCULAR; INTRAVENOUS at 20:59

## 2022-01-27 RX ADMIN — LIDOCAINE HYDROCHLORIDE 5 ML: 10 INJECTION, SOLUTION EPIDURAL; INFILTRATION; INTRACAUDAL; PERINEURAL at 13:01

## 2022-01-27 RX ADMIN — FENTANYL CITRATE 100 MCG: 50 INJECTION, SOLUTION INTRAMUSCULAR; INTRAVENOUS at 13:02

## 2022-01-27 RX ADMIN — SODIUM CHLORIDE, POTASSIUM CHLORIDE, SODIUM LACTATE AND CALCIUM CHLORIDE: 600; 310; 30; 20 INJECTION, SOLUTION INTRAVENOUS at 16:20

## 2022-01-27 RX ADMIN — Medication 1 MILLI-UNITS/MIN: at 00:00

## 2022-01-27 RX ADMIN — NIFEDIPINE 30 MG: 30 TABLET, EXTENDED RELEASE ORAL at 20:59

## 2022-01-27 RX ADMIN — SODIUM CHLORIDE, POTASSIUM CHLORIDE, SODIUM LACTATE AND CALCIUM CHLORIDE: 600; 310; 30; 20 INJECTION, SOLUTION INTRAVENOUS at 21:03

## 2022-01-27 RX ADMIN — Medication 909 MILLI-UNITS/MIN: at 15:56

## 2022-01-27 RX ADMIN — MORPHINE SULFATE 10 MG: 10 INJECTION INTRAVENOUS at 03:45

## 2022-01-27 RX ADMIN — SODIUM CHLORIDE, POTASSIUM CHLORIDE, SODIUM LACTATE AND CALCIUM CHLORIDE: 600; 310; 30; 20 INJECTION, SOLUTION INTRAVENOUS at 12:22

## 2022-01-27 ASSESSMENT — PULMONARY FUNCTION TESTS
PIF_VALUE: 1
PIF_VALUE: 0
PIF_VALUE: 1
PIF_VALUE: 3
PIF_VALUE: 1
PIF_VALUE: 0
PIF_VALUE: 0
PIF_VALUE: 1
PIF_VALUE: 0
PIF_VALUE: 1
PIF_VALUE: 1
PIF_VALUE: 0
PIF_VALUE: 1
PIF_VALUE: 0
PIF_VALUE: 0
PIF_VALUE: 1
PIF_VALUE: 2
PIF_VALUE: 1
PIF_VALUE: 2
PIF_VALUE: 1
PIF_VALUE: 0
PIF_VALUE: 1
PIF_VALUE: 0
PIF_VALUE: 3
PIF_VALUE: 1
PIF_VALUE: 1
PIF_VALUE: 0
PIF_VALUE: 2
PIF_VALUE: 1
PIF_VALUE: 0
PIF_VALUE: 1
PIF_VALUE: 0
PIF_VALUE: 0
PIF_VALUE: 1
PIF_VALUE: 1
PIF_VALUE: 2
PIF_VALUE: 1
PIF_VALUE: 0
PIF_VALUE: 1
PIF_VALUE: 1
PIF_VALUE: 0
PIF_VALUE: 1
PIF_VALUE: 2
PIF_VALUE: 1
PIF_VALUE: 0
PIF_VALUE: 1
PIF_VALUE: 1
PIF_VALUE: 30
PIF_VALUE: 1
PIF_VALUE: 3
PIF_VALUE: 0
PIF_VALUE: 1
PIF_VALUE: 0
PIF_VALUE: 0
PIF_VALUE: 1
PIF_VALUE: 0
PIF_VALUE: 0

## 2022-01-27 ASSESSMENT — PAIN SCALES - GENERAL: PAINLEVEL_OUTOF10: 5

## 2022-01-27 NOTE — BRIEF OP NOTE
Department of Obstetrics and Gynecology  Obstetrical Brief Operative Report  [facility]    Patient: Juliet Pedraza   : 2000  MRN: [id number]       Acct: [de-identified]   Date of Procedure: 22    Pre-operative Diagnosis: 22 y.o. female  at 36w4d   IOL for chronic hypertension with super-imposed preeclampsia without severe features   section for arrest of descent  Chorioamnionitis  Fetal growth restrictions  Late prenatal care  Anemia   BMI 38.77     Post-operative Diagnosis: Viable male infant. Same as above. Procedure: primary low transverse  section    Surgeon: Анна Negron DO  Assistant(s): Cayden Post DO PGY4; Belinda Bateman DO, PGY2; Cayetano Keys, MS3    Anesthesia: spinal with Duramorph    Information for the patient's :  Nikita Mosqueda [[id number]]   male   Birth Weight: N/A     Information for the patient's :  Nikita Mosqueda [[id number]]          Findings:  Live Born weight pending male infant in cephalic presentation with Apgar report pending, normal appearing uterus tubes and polycystic appearing ovaries   Quantitative Blood Loss: pending immediately post-operatively  Total IV Fluids: 1500ml  Urine output: 100ml cloudy urine   Drains:  roberts catheter  Specimens:  placenta sent to pathology, cord blood and cord gases  Instrument and Sponge Count: Correct  Complications: uterine atony corrected with Hemabate,  respiratory distress  Condition: Infant's condition guarded, transfer to Special Care Nursery, Mother stable, transfer to post anesthesia recovery    See dictated operative report for full details.     Belinda Bateman DO  Ob/Gyn Resident  2022, 5:03 PM    Date: 2022  Time: 10:29 AM      Patient Name: Juliet Pedraza  Patient : 2000  Room/Bed: 2572/3644-93  Admission Date/Time: 2022 10:25 PM        Attending Physician Statement  I have discussed the care of Juliet Pedraza including pertinent history and exam findings with the resident. I have reviewed and edited their note in the electronic medical record. The key elements of all parts of the encounter have been performed/reviewed by me . I agree with the assessment, plan and orders as documented by the resident. The level of care submitted represents to the best of my ability the care documented in the medical record today. GC Modifier. This service has been performed in part by a resident under the direction of a teaching physician.         Attending's Name:  Devang Wright DO

## 2022-01-27 NOTE — ANESTHESIA PRE PROCEDURE
Department of Anesthesiology  Preprocedure Note       Name:  Pati Vega   Age:  22 y.o.  :  2000                                          MRN:  [id number]         Date:  2022      Surgeon: * No surgeons listed *    Procedure: * No procedures listed *    Medications prior to admission:   Prior to Admission medications    Medication Sig Start Date End Date Taking?  Authorizing Provider   aspirin EC 81 MG EC tablet Take 1 tablet by mouth daily 10/13/21  Yes Jolene Huang DO   Prenatal Vit-Fe Fumarate-FA (PRENATAL VITAMIN) 27-1 MG TABS tablet Take 1 tablet by mouth daily May substitute with any prenatal vit pt insurance will cover 10/11/21  Yes Ari De La Fuente MD   famotidine (PEPCID) 20 MG tablet Take 1 tablet by mouth 2 times daily 21   Thea Chan DO       Current medications:    Current Facility-Administered Medications   Medication Dose Route Frequency Provider Last Rate Last Admin    ropivacaine (NAROPIN) 0.2% injection 0.2%             ropivacaine 0.2% in sodium chloride 0.9% (OB) epidural 100 mL  10 mL/hr Epidural Continuous Gentry Reddy MD        naloxone [facility]) injection 0.4 mg  0.4 mg IntraVENous PRN Gentry Reddy MD        nalbuphine (NUBAIN) injection 5 mg  5 mg IntraVENous Q4H PRN Gentry Reddy MD        ondansetron [facility]) injection 4 mg  4 mg IntraVENous Q6H PRN Gentry Reddy MD        ePHEDrine injection 10 mg  10 mg IntraVENous PRN Gentry Reddy MD        oxytocin (PITOCIN) 30 units in 500 mL infusion  1-20 shanae-units/min IntraVENous Continuous Em Mccray DO 5 mL/hr at 22 0632 5 shanae-units/min at 22 3936    lactated ringers infusion   IntraVENous Continuous Amber Howard  mL/hr at 22 0504 New Bag at 22 0504    lactated ringers bolus  500 mL IntraVENous PRN Amber Howard MD        Or    lactated ringers bolus  1,000 mL IntraVENous PRN Amber Howard MD        sodium chloride flush 0.9 % injection 5-40 mL  5-40 mL IntraVENous 2 times per day Mariana Huertas MD        sodium chloride flush 0.9 % injection 5-40 mL  5-40 mL IntraVENous PRN Mariana Huertas MD        0.9 % sodium chloride infusion  25 mL IntraVENous PRN Mariana Huertas MD        lidocaine PF 1 % injection 30 mL  30 mL Other PRN Mariana Huertas MD        ondansetron (ZOFRAN) injection 4 mg  4 mg IntraVENous Q6H PRN Mariana Huertas MD        acetaminophen (TYLENOL) tablet 1,000 mg  1,000 mg Oral Q6H PRN Mariana Huertas MD        benzocaine-menthol (DERMOPLAST) 20-0.5 % spray   Topical PRN Mariana Huertas MD        levonorgestrel (MIRENA) IUD 52 mg 1 each  1 each IntraUTERine Once Mariana Huertas MD           Allergies:  No Known Allergies    Problem List:    Patient Active Problem List   Diagnosis Code    No prenatal care in current pregnancy in second trimester O09.32    Obesity E66.9    High-risk pregnancy, second trimester O09.92    cHTN (no meds) w/ BUTCH w/o SF I10    Chlamydia infection  O98.819, A74.9    FGR (Dx 11/15/21) O36.5990    Elevated 1hr GTT O99.810    35 weeks gestation of pregnancy Z3A.35    Dysmenorrhea N94.6    38 weeks gestation of pregnancy Z3A.38       Past Medical History:        Diagnosis Date    Chronic HTN 10/13/2021    Obese        Past Surgical History:  History reviewed. No pertinent surgical history.     Social History:    Social History     Tobacco Use    Smoking status: Never Smoker    Smokeless tobacco: Never Used   Substance Use Topics    Alcohol use: Not Currently     Comment: rare when not preg                                Counseling given: Not Answered      Vital Signs (Current):   Vitals:    01/27/22 0510 01/27/22 0515 01/27/22 0600 01/27/22 0800   BP:  (!) 140/101 (!) 139/101 (!) 142/95   Pulse: 80 83 89 93   Resp:    24   Temp:    37 °C (98.6 °F)   TempSrc:       SpO2:       Weight:       Height:                                                  BP Readings from Last 3 Encounters:   01/27/22 (!) 142/95   01/20/22 126/86 01/18/22 (!) 148/98       NPO Status:                                                                                 BMI:   Wt Readings from Last 3 Encounters:   01/25/22 233 lb (105.7 kg)   01/20/22 233 lb (105.7 kg)   01/18/22 233 lb (105.7 kg)     Body mass index is 38.77 kg/m². CBC:   Lab Results   Component Value Date    WBC 10.7 01/25/2022    RBC 3.73 01/25/2022    HGB 10.8 01/25/2022    HCT 32.8 01/25/2022    MCV 87.9 01/25/2022    RDW 14.2 01/25/2022     01/25/2022       CMP:   Lab Results   Component Value Date     01/25/2022    K 3.4 01/25/2022     01/25/2022    CO2 21 01/25/2022    BUN 4 01/25/2022    CREATININE 0.52 01/25/2022    GFRAA >60 01/25/2022    LABGLOM >60 01/25/2022    GLUCOSE 113 01/25/2022    PROT 7.2 01/25/2022    CALCIUM 9.3 01/25/2022    BILITOT 0.23 01/25/2022    ALKPHOS 236 01/25/2022    AST 31 01/25/2022    ALT 20 01/25/2022       POC Tests: No results for input(s): POCGLU, POCNA, POCK, POCCL, POCBUN, POCHEMO, POCHCT in the last 72 hours.     Coags: No results found for: PROTIME, INR, APTT    HCG (If Applicable):   Lab Results   Component Value Date    PREGTESTUR POSITIVE (A) 06/13/2021    HCGQUANT 1,917 (H) 06/13/2021        ABGs: No results found for: PHART, PO2ART, MIW4RHP, LRU7TCI, BEART, P7GOVPZW     Type & Screen (If Applicable):  No results found for: LABABO, LABRH    Drug/Infectious Status (If Applicable):  Lab Results   Component Value Date    HEPCAB NONREACTIVE 11/22/2021       COVID-19 Screening (If Applicable):   Lab Results   Component Value Date    COVID19 Not Detected 01/26/2022           Anesthesia Evaluation  Patient summary reviewed and Nursing notes reviewed no history of anesthetic complications:   Airway: Mallampati: II  TM distance: >3 FB   Neck ROM: full  Mouth opening: > = 3 FB Dental: normal exam         Pulmonary:Negative Pulmonary ROS and normal exam                               Cardiovascular:  Exercise tolerance: good (>4 METS), (+) hypertension: no interval change,         Rhythm: regular  Rate: normal                    Neuro/Psych:   Negative Neuro/Psych ROS              GI/Hepatic/Renal: Neg GI/Hepatic/Renal ROS            Endo/Other: Negative Endo/Other ROS                    Abdominal:   (+) obese,           Vascular: negative vascular ROS. Other Findings:             Anesthesia Plan      epidural     ASA 2             Anesthetic plan and risks discussed with patient.                       Antoinette Lopez, APRN - CRNA   1/27/2022

## 2022-01-27 NOTE — PROGRESS NOTES
Labor Progress Note    Luis Jeffers is a 22 y.o. female  at 36w4d  The patient was seen and examined. Her pain is well controlled with epidural. She reports fetal movement is present, complains of contractions, complains of loss of fluid, complains of vaginal bleeding. Vital Signs:  Vitals:    22 1130 22 1200 22 1222 22 1240   BP: 132/84 (!) 160/105 (!) 152/106 136/84   Pulse: 127 118 118 123   Resp:    Temp:  99.5 °F (37.5 °C)     TempSrc:  Oral     SpO2:       Weight:       Height:             FHT: 120 moderate variability, accelerations present, early and variable decelerations intermittently resolved with position  Contractions: q 2-3 minutes    Cervical exam performed by RN  Pitocin: @ 5 mu/min    Membranes: Ruptured clear fluid  Scalp Electrode in place: absent  Intrauterine Pressure Catheter in Place: in place    Interventions: none    Assessment/Plan: Luis Jeffers is a 22 y.o. female  at 36w4d admitted for cHTN on no medications with BUTCH without SF   - GBS negative, No indication for GBS prophylaxis   - SVE: 9, 100, 0   - SROM (clr) @ 0600   - Pitocin started @ 0000, continue per protocol. Consider shutting off if decelerations continue   - S/p Cook's   - S/p Cytotec 25 PO x3   - S/p Cervidil x1   - Epidural placed and functioning    - IUPC in place and functioning   - Continue current management. Attending updated and in agreement with plan.      Ruth Freedman DO  Ob/Gyn Resident  2022, 12:45 PM

## 2022-01-27 NOTE — PROGRESS NOTES
Labor Progress Note    Leora Navarrete is a 22 y.o. female  at 36w4d  The patient was seen and examined. Her pain is well controlled but patient is considering an epidural. She reports fetal movement is present, complains of contractions, complains of loss of fluid, complains of vaginal bleeding. S/p Cook's balloon. SROM noted at time of SVE. Clear fluid noted. Patient states the leakage started at 0600. Vital Signs:  Vitals:    22 0510 22 0515 22 0600 22 0800   BP:  (!) 140/101 (!) 139/101 (!) 142/95   Pulse: 80 83 89 93   Resp:    24   Temp:    98.6 °F (37 °C)   TempSrc:       SpO2:       Weight:       Height:             FHT: 135, moderate variability, accelerations present, possible variable decelerations will consider IUPC after epidural  Contractions: difficult to trace    Chaperone for Intimate Exam: Chaperone was present for entire exam, Chaperone Name: Yina Shafer RN  Cervical Exam: 4-5 cm dilated, 70 effaced, -1 station  Pitocin: @ 5 mu/min    Membranes: Ruptured clear fluid  Scalp Electrode in place: absent  Intrauterine Pressure Catheter in Place: absent    Interventions: none    Assessment/Plan: Leora Navarrete is a 22 y.o. female  at 36w4d admitted for cHTN on no medications with BUTCH without SF   - GBS negative, No indication for GBS prophylaxis   - SVE: 4-5, 70, -1   - SROM (clr) @ 0600   - Pitocin started @ 0000, continue per protocol   - S/p Cook's   - S/p Cytotec 25 PO x3   - S/p Cervidil x1   - Epidural ordered   - Continue current management. Will place IUPC after epidural.     Attending updated and in agreement with plan.      Anika Baxter,   Ob/Gyn Resident  2022, 8:41 AM

## 2022-01-27 NOTE — PROGRESS NOTES
Labor Progress Note    Justino Gooden is a 22 y.o. female  at 43w4d  The patient was seen and examined. Her pain is well controlled after receiving morphine/phenergan. She reports fetal movement is present, complains of contractions, denies loss of fluid, denies vaginal bleeding. Vital Signs:  Vitals:    22 1310 22 1730 22 1745 22 195   BP: 138/86 (!) 167/105 (!) 146/97 (!) 141/93   Pulse: 81 93 88 96   Resp: 18 18  18   Temp: 98.1 °F (36.7 °C) 98.1 °F (36.7 °C)  98.4 °F (36.9 °C)   TempSrc: Oral Oral  Oral   SpO2:    98%   Weight:       Height:         FHT: 140, moderate variability, accelerations absent, decelerations absent  Contractions: rare    Chaperone for Intimate Exam: Chaperone was present for entire exam, Chaperone Name: Dorothea Gonzalez  Cervical Exam: 1 cm dilated, 60 effaced, -2 station  Pitocin: @ 0 mu/min    Membranes: Intact  Scalp Electrode in place: absent  Intrauterine Pressure Catheter in Place: absent    Interventions: Cook's balloon without difficulty    Assessment/Plan: Justino Gooden is a 22 y.o. female  at 43w4d admitted for IOL 2/2 cHTN (no meds), now newly diagnosed with BUTCH w/o SF   - GBS negative, No indication for GBS prophylaxis   - Afebrile   - cEFM/TOCO   - S/p Cytotec 25 PO x2   - S/p Cervidil x1   - Cook/s balloon in place, out @ 1000   - Will start low dose pitocin per protocol once patient has eaten dinner   - S/p morphine/phenegan x2   - Continue to monitor and anticipate vaginal delivery    cHTN (no meds) w/ BUTCH w/o SF   - One severe range BP since admission @ 1730 on 22.  Overall BPs intermittently elevated   - Denies s/s PreE   - PreE labs wnl, P/C 1.39   - Continue to closely monitor     Attending and senior resident updated and in agreement with plan    Aleida Mcdonald MD  Ob/Gyn Resident  2022, 10:12 PM

## 2022-01-27 NOTE — PROGRESS NOTES
Labor Progress Note    Sadaf Yun is a 22 y.o. female  at 36w4d  The patient was seen and examined. Her pain is well controlled with epidural. She reports fetal movement is present, complains of contractions, complains of loss of fluid, complains of vaginal bleeding. IUPC placed. Vital Signs:  Vitals:    22 0916 22 0920 22 0925 22 0930   BP: (!) 159/98 (!) 133/94 (!) 144/89 (!) 148/95   Pulse: 117 107 107 109   Resp:    Temp:       TempSrc:       SpO2:  98% 98% 98%   Weight:       Height:             FHT: 130 moderate variability, accelerations present, likely early decelerations will consider IUPC after epidural  Contractions: q 2-3 minutes    Chaperone for Intimate Exam: Chaperone was present for entire exam, Chaperone Name: Ne GARG RN  Cervical Exam: 4-5 cm dilated, 70 effaced, 0 station  Pitocin: @ 5 mu/min    Membranes: Ruptured clear fluid  Scalp Electrode in place: absent  Intrauterine Pressure Catheter in Place: absent    Interventions: none    Assessment/Plan: Sadaf Yun is a 22 y.o. female  at 36w4d admitted for cHTN on no medications with BUTCH without SF   - GBS negative, No indication for GBS prophylaxis   - SVE: 4-5, 70, 0   - SROM (clr) @ 0600   - Pitocin started @ 0000, continue per protocol   - S/p Cook's   - S/p Cytotec 25 PO x3   - S/p Cervidil x1   - Epidural placed and functioning    - IUPC placed   - Continue current management. Attending updated and in agreement with plan.      Brianda Saldaña DO  Ob/Gyn Resident  2022, 9:58 AM

## 2022-01-27 NOTE — ANESTHESIA PROCEDURE NOTES
Spinal Block    Patient location during procedure: OR  Start time: 1/27/2022 3:30 PM  Reason for block: primary anesthetic and at surgeon's request  Staffing  Performed: resident/CRNA   Resident/CRNA: CECIL Alarcon CRNA  Preanesthetic Checklist  Completed: patient identified, IV checked, site marked, risks and benefits discussed, surgical consent, monitors and equipment checked, pre-op evaluation, timeout performed, anesthesia consent given, oxygen available and patient being monitored  Spinal Block  Patient position: sitting  Prep: Betadine  Patient monitoring: frequent blood pressure checks and continuous pulse ox  Approach: midline  Location: L3/L4  Provider prep: sterile gloves and mask  Local infiltration: lidocaine  Dose: 0.2  Agent: bupivacaine  Adjuvant: duramorph  Dose: 2  Dose: 2  Needle  Needle type: Pencan   Needle gauge: 24 G  Needle length: 4 in  Assessment  Sensory level: T4  Swirl obtained: Yes  CSF: clear  Attempts: 1  Hemodynamics: stable

## 2022-01-27 NOTE — FLOWSHEET NOTE
FOB came out to desk saying her balloon fell out. Patient states it came out when she was throwing up. Support given and plan of care discussed. Stated keeps leaking and going into the bathroom,  Instructed it could be her bag of banuelos.   Oral care at this time

## 2022-01-27 NOTE — OP NOTE
Operative Note  Department of Obstetrics and Gynecology  [address] [facility]     Patient: Fanta Quiñonez   : 2000  MRN: [id number]       Acct: [de-identified]   PCP: Non-Staff Physician  Date of Procedure: 22    Pre-operative Diagnosis: 22 y.o. female  at 36w4d   IOL for chronic hypertension with super-imposed preeclampsia without severe features   section for arrest of descent and suspected CPD  Chorioamnionitis  Fetal growth restrictions  Late prenatal care  Anemia   BMI 38.77    Post-operative Diagnosis: Viable male infant. Same as above. Polycystic ovaries. Procedure: primary low transverse  section    Indications: Fanta Quiñonez is a 22 y.o. female  at 38w0d admitted for IOL 2/2 chronic hypertension on no medications. During her admission, she met criteria for chronic hypertension on no medications with BUTCH without severe features due to normal PreE labs and an abnormal P/C of 1.39. She received Cervidil, Cytotec 25 PO x2, Morphine/Phenergan x3, Cooks balloon, pitocin, and SROM @ 0600. The patient was noted to be complete and pushed for 2 hours with limited fetal descent. The decision was made to proceed with a  section secondary to suspected cephalopelvic disproportion with arrest of fetal descent. Surgeon: Reyes Martinez DO  Assistant(s): Nell Humphries DO PGY4; Kamaljit Leon DO, PGY2; Milli George, MS3    Anesthesia: spinal with duramorph    Procedure Details   The patient was seen pre-operatively. The risks, benefits, complications, treatment options, and expected outcomes were discussed with the patient. The patient concurred with the proposed plan, giving informed consent. The patient was taken to the Operating Room, identified as Fanta Quiñonez and the procedure verified as  Delivery. A Time Out was held and the above information confirmed.      After spinal anesthesia, the patient was draped and prepped in the usual sterile manner. A Pfannenstiel incision was made and carried down through the subcutaneous tissue to the fascia using scalpel. Fascial incision was made and extended transversely using schilling scissors for sharp dissection. The fascia was  from the underlying rectus tissue superiorly and inferiorly using blunt dissection. The peritoneum was identified and entered bluntly. Peritoneal incision was extended longitudinally with blunt stretch, bladder retractor was placed. A low transverse uterine incision was made using a new scalpel blade. Blunt stretch on the hysterotomy incision was made revealing thick meconium stained fluid. Delivered from cephalic presentation was a Live Born male infant. The infant was suctioned, dried and the umbilical cord was clamped and prior to one minute due to concern for  distress. The infant was taken to the warmer and attended by NICU for evaluation. A second section of cord was clamped and cut and sent for gases. Cord blood was obtained for evaluation. The placenta was removed manually and spontaneously with gentle traction and appeared intact, whole and that the umbilical cord had three vessels noted. Pitocin was started. The uterine outline appeared normal. The uterus was exteriorized and cleaned of all clots and debris. The uterine incision was closed with running sutures of 0 Vicryl. The uterus was reintroduced into the abdominal cavity. Bilateral abdominal gutters were cleared of all clots and debris. Bilateral tubes and ovaries were visualized. Ovaries appeared polycystic but otherwise anatomy appeared normal. The hysterotomy was again inspected and found to be hemostatic. Rectus muscles were inspected and found to be hemostatic. The fascia was then reapproximated with running sutures of 0 Vicryl. The subcuticular space was irrigated copiously. The subcuticular space was closed using a 0 Vicryl suture in a running fashion.  The skin was reapproximated with an Insorb stapler. The skin was then cleansed and dressed with a Silver dressing in sterile fashion. Instrument, sponge, and needle counts were correct prior the abdominal closure and at the conclusion of the case. The urine remained clear throughout the case. Ampicillin, Gentamicin and Clindamycin were given for antibiotic prophylaxis. SCDs for DVT prophylaxis remain in place for the post operative period. Dr. Kolton Matthews was present for the entire operation. Findings:  Live Born weight pending male infant in cephalic presentation with Apgars of 1 at one minute, 3 at five, ten and fifteen minutes, and 7 at twenty minutes, normal appearing uterus tubes and polycystic appearing ovaries   Quantitative Blood Loss: 175ml   Total IV Fluids: 1500ml  Urine output: 100ml cloudy urine   Drains:  roberts catheter  Specimens:  placenta sent to pathology, cord blood and cord gases  Instrument and Sponge Count: Correct  Complications: uterine atony corrected with Hemabate,  respiratory distress  Condition: Infant stable at 20 minutes of life, transfer to [address] [place] [address], Mother stable, transfer to post anesthesia recovery    Rebeca Kumar DO  OB/GYN Resident  2022, 6:15 PM    This dictation was performed by a resident physician and then was thoroughly reviewed by the attending prior to being signed. Date: 2022  Time: 11:01 AM      Patient Name: Jonathon Caicedo  Patient : 2000  Room/Bed: 4218/2105-94  Admission Date/Time: 2022 10:25 PM        Attending Physician Statement  I have discussed the care of Jonathon Caicedo, including pertinent history and exam findings with the resident. I have reviewed and edited their note in the electronic medical record. The key elements of all parts of the encounter have been performed/reviewed by me . I agree with the assessment, plan and orders as documented by the resident.      The level of care submitted represents to the best of my ability the care documented in the medical record today. GC Modifier. This service has been performed in part by a resident under the direction of a teaching physician.         Attending's Name:  Jania Kelly DO

## 2022-01-27 NOTE — FLOWSHEET NOTE
Sleeping on and off, through contractions. Mother and boyfriend at bedside. C/o feeling pressure. Dr. Neal Siddiqui visited.

## 2022-01-27 NOTE — ANESTHESIA PROCEDURE NOTES
Epidural Block    Patient location during procedure: OB  Start time: 1/27/2022 9:08 AM  Reason for block: labor epidural  Staffing  Resident/CRNA: CECIL Burciaga CRNA  Preanesthetic Checklist  Completed: patient identified, IV checked, site marked, risks and benefits discussed, surgical consent, monitors and equipment checked, pre-op evaluation, timeout performed, anesthesia consent given, oxygen available and patient being monitored  Epidural  Patient position: sitting  Prep: Betadine  Patient monitoring: continuous pulse ox and frequent blood pressure checks  Approach: midline  Location: lumbar (1-5)  Injection technique: AZEB air  Guidance: paresthesia technique  Provider prep: mask and sterile gloves  Needle  Needle type: Tuohy   Needle gauge: 17 G  Needle length: 3.5 in  Needle insertion depth: 7 cm  Catheter type: end hole  Catheter size: 18 G  Catheter at skin depth: 12 cm  Test dose: negative  Assessment  Sensory level: T6  Hemodynamics: stable  Attempts: 1

## 2022-01-27 NOTE — SIGNIFICANT EVENT
Obstetric/Gynecology Resident Significant Event Note    Patient met criteria for chorioamnionitis with Temps 101.8 and 100.4 degrees F with maternal tachycardia. Pt denies any fundal tenderness, fever, chills. Amp/Gent/Clinda ordered. Dr. Kitty Soler at bedside to evaluate @1500. Patient has been pushing x2 hours with concern for little descent and caput felt. Patient visibly exhausted. Discussed proceeding to push versus proceeding with  section and patient requesting  section at this time due to suspicion for arrest of descent and suspected CPD. Risks of C/S were discussed and consent was signed and placed in chart. Pt verbalized and expressed understanding. Anesthesia and NICU notified and will proceed with CS.      Heidi Wilson DO  OBELICEO Resident, [place] [id number]  2022, 6:17 PM

## 2022-01-27 NOTE — FLOWSHEET NOTE
Tearful, c/o pressure. To back for SVE, support given. Dr. Cathleen Carroll notified and visited. SVE- epidural bolus through PCA. Instructed on peanut ball. Stated dose didn't help and feels pain and not just pressure.

## 2022-01-27 NOTE — PROGRESS NOTES
Labor Progress Note    Magui Moses is a 22 y.o. female  at 43w4d  The patient was seen and examined. Her pain is well controlled. She reports fetal movement is present, complains of contractions, denies loss of fluid, denies vaginal bleeding. Vital Signs:  Vitals:    22 1310 22 1730 22 1745 22 1959   BP: 138/86 (!) 167/105 (!) 146/97 (!) 141/93   Pulse: 81 93 88 96   Resp: 18 18  18   Temp: 98.1 °F (36.7 °C) 98.1 °F (36.7 °C)  98.4 °F (36.9 °C)   TempSrc: Oral Oral  Oral   SpO2:    98%   Weight:       Height:         FHT: 135, moderate variability, accelerations present, decelerations absent  Contractions: rare     Chaperone for Intimate Exam: Chaperone was present for entire exam, Chaperone Name: Elieser Gallegos RN  Cervical Exam: 1 cm dilated, 60 effaced, -2 station  Pitocin: @ 0 mu/min    Membranes: Intact  Scalp Electrode in place: absent  Intrauterine Pressure Catheter in Place: absent    Interventions: SVE    Assessment/Plan: Magui Moses is a 22 y.o. female  at 43w4d admitted for IOL 2/2 cHTN (no meds), now newly diagnosed with BUTCH w/o SF   - GBS negative, No indication for GBS prophylaxis   - Afebrile   - cEFM/TOCO    - S/p Cytotec PO x2    - S/p cervidil x1   - Discussed transcervical roberts balloon with patient. She is amenable to placement with premedication with morphine/phenergan.     - S/p Morphine/phenergan x1   - Morphine 2 mg IV and 8 mg IM with phenergan 25 mg IM ordered   - Will place roberts balloon once patient is comfortable   - Continue to monitor closely and anticipate vaginal delivery    cHTN (no meds) w/ BUTCH w/o SF   - BPs intermittently elevated since admission   - One severe range with a nonsevere repeat @ 1730 on 22   - Denies s/s PreE    - PreE labs wnl, P/C 1.39    - Continue to monitor closely     Senior resident updated and in agreement with plan    [facility], MD  Ob/Gyn Resident  2022, 9:29 PM

## 2022-01-27 NOTE — ANESTHESIA POSTPROCEDURE EVALUATION
Department of Anesthesiology  Postprocedure Note    Patient: Gretchen Madrigal  MRN: [id number]  YOB: 2000  Date of evaluation: 2022  Time:  4:47 PM     Procedure Summary     Date: 22 Room / Location: [facility] OR [address] [place] [address]    Anesthesia Start: 7389 Anesthesia Stop: 1365    Procedures:        SECTION (N/A )      Labor Analgesia Diagnosis:     Surgeons: Pako Espinoza DO Responsible Provider: Jamal Carroll MD    Anesthesia Type: epidural ASA Status: 2          Anesthesia Type: epidural    Artem Phase I:      Artem Phase II:      Last vitals: Reviewed and per EMR flowsheets.        Anesthesia Post Evaluation    Patient location during evaluation: PACU  Patient participation: complete - patient participated  Level of consciousness: awake and alert  Pain score: 0  Nausea & Vomiting: no nausea and no vomiting  Complications: no  Cardiovascular status: blood pressure returned to baseline  Respiratory status: acceptable  Hydration status: euvolemic
Department of Anesthesiology  Postprocedure Note    Patient: Royden Hamman  MRN: [id number]  YOB: 2000  Date of evaluation: 2022  Time:  6:00 PM     Procedure Summary     Date: 22 Room / Location: [facility] OR [address] [place] [address]    Anesthesia Start:  Anesthesia Stop:     Procedures:        SECTION (N/A )      Labor Analgesia Diagnosis:     Surgeons: Leesa Silva DO Responsible Provider: Bere Burleson MD    Anesthesia Type: epidural ASA Status: 2          Anesthesia Type: epidural    Artem Phase I: Artem Score: 9    Artem Phase II: Artem Score: 10    Last vitals: Reviewed and per EMR flowsheets.        Anesthesia Post Evaluation    Patient location during evaluation: bedside  Patient participation: complete - patient participated  Level of consciousness: awake and alert  Pain score: 0  Airway patency: patent  Nausea & Vomiting: no nausea and no vomiting  Complications: no  Cardiovascular status: hemodynamically stable  Respiratory status: acceptable and room air  Hydration status: euvolemic
Quality 110: Preventive Care And Screening: Influenza Immunization: Influenza Immunization previously received during influenza season
Detail Level: Detailed

## 2022-01-27 NOTE — PROGRESS NOTES
Labor Progress Note    Bonny Michele is a 22 y.o. female  at 36w4d  The patient was seen and examined. Her pain is well controlled. She is resting comfortably in bed. She reports fetal movement is present, complains of contractions, denies loss of fluid, denies vaginal bleeding. Vital Signs:  Vitals:    22 0100 22 0200 22 0300 22 0400   BP: (!) 135/93 135/85 (!) 126/97 125/89   Pulse: 91 98 116 94   Resp: 18 18 18 16   Temp:    98.6 °F (37 °C)   TempSrc:    Oral   SpO2:       Weight:       Height:         FHT: 135, moderate variability, accelerations present, decelerations absent  Contractions: irregular, every 2-7 minutes    Chaperone for Intimate Exam: NA  Cervical Exam: deferred  Pitocin: @ 4 mu/min    Membranes: Intact  Scalp Electrode in place: absent  Intrauterine Pressure Catheter in Place: absent    Interventions: none    Assessment/Plan: Bonny Michele is a 22 y.o. female  at 36w4d admitted for IOL 2/2 cHTN w/ BUTCH w/o SF (new dx)   - GBS negative, No indication for GBS prophylaxis   - Afebrile   - cEFM/TOCO   - S/p Cytotec 25 PO x3   - S/p Cervidil x1   - Cooks in place, out @ 1000   - Continue low dose pitocin per protocol   - S/p morphine/phenergan x2   - Continue to monitor closely    cHTN w/ BUTCH w/o SF   - Some BP intermittently elevated since admission.  One severe range noted @ 1730 on 22   - Denies s/s PreE    - PreE labs wnl, P/C 1.39    - Continue to monitor closely     Anemia   - Hgb on admission was 10.8   - Clinically asymptomatic   - Continue to monitor closely     Senior resident updated and in agreement with plan    Rondell Claude, MD  Ob/Gyn Resident  2022, 5:18 AM

## 2022-01-28 PROBLEM — O09.92 HIGH-RISK PREGNANCY, SECOND TRIMESTER: Status: RESOLVED | Noted: 2021-10-11 | Resolved: 2022-01-28

## 2022-01-28 PROBLEM — Z3A.35 35 WEEKS GESTATION OF PREGNANCY: Status: RESOLVED | Noted: 2022-01-05 | Resolved: 2022-01-28

## 2022-01-28 LAB
ALBUMIN SERPL-MCNC: 2.6 G/DL (ref 3.5–5.2)
ALBUMIN/GLOBULIN RATIO: 0.8 (ref 1–2.5)
ALP BLD-CCNC: 167 U/L (ref 35–104)
ALT SERPL-CCNC: 15 U/L (ref 5–33)
ANION GAP SERPL CALCULATED.3IONS-SCNC: 11 MMOL/L (ref 9–17)
AST SERPL-CCNC: 28 U/L
BILIRUB SERPL-MCNC: 0.44 MG/DL (ref 0.3–1.2)
BUN BLDV-MCNC: 5 MG/DL (ref 6–20)
BUN/CREAT BLD: ABNORMAL (ref 9–20)
CALCIUM SERPL-MCNC: 8.5 MG/DL (ref 8.6–10.4)
CHLORIDE BLD-SCNC: 98 MMOL/L (ref 98–107)
CO2: 22 MMOL/L (ref 20–31)
CREAT SERPL-MCNC: 0.59 MG/DL (ref 0.5–0.9)
GFR AFRICAN AMERICAN: >60 ML/MIN
GFR NON-AFRICAN AMERICAN: >60 ML/MIN
GFR SERPL CREATININE-BSD FRML MDRD: ABNORMAL ML/MIN/{1.73_M2}
GFR SERPL CREATININE-BSD FRML MDRD: ABNORMAL ML/MIN/{1.73_M2}
GLUCOSE BLD-MCNC: 68 MG/DL (ref 70–99)
HCT VFR BLD CALC: 29.7 % (ref 36.3–47.1)
HEMOGLOBIN: 9.9 G/DL (ref 11.9–15.1)
MCH RBC QN AUTO: 29.7 PG (ref 25.2–33.5)
MCHC RBC AUTO-ENTMCNC: 33.3 G/DL (ref 28.4–34.8)
MCV RBC AUTO: 89.2 FL (ref 82.6–102.9)
NRBC AUTOMATED: 0 PER 100 WBC
PDW BLD-RTO: 14.6 % (ref 11.8–14.4)
PLATELET # BLD: 251 K/UL (ref 138–453)
PMV BLD AUTO: 12.4 FL (ref 8.1–13.5)
POTASSIUM SERPL-SCNC: 3.2 MMOL/L (ref 3.7–5.3)
RBC # BLD: 3.33 M/UL (ref 3.95–5.11)
SODIUM BLD-SCNC: 131 MMOL/L (ref 135–144)
TOTAL PROTEIN: 5.7 G/DL (ref 6.4–8.3)
WBC # BLD: 22.3 K/UL (ref 4.5–13.5)

## 2022-01-28 PROCEDURE — 6370000000 HC RX 637 (ALT 250 FOR IP): Performed by: STUDENT IN AN ORGANIZED HEALTH CARE EDUCATION/TRAINING PROGRAM

## 2022-01-28 PROCEDURE — 2500000003 HC RX 250 WO HCPCS: Performed by: STUDENT IN AN ORGANIZED HEALTH CARE EDUCATION/TRAINING PROGRAM

## 2022-01-28 PROCEDURE — 6360000002 HC RX W HCPCS: Performed by: STUDENT IN AN ORGANIZED HEALTH CARE EDUCATION/TRAINING PROGRAM

## 2022-01-28 PROCEDURE — 85027 COMPLETE CBC AUTOMATED: CPT

## 2022-01-28 PROCEDURE — 1220000000 HC SEMI PRIVATE OB R&B

## 2022-01-28 PROCEDURE — 36415 COLL VENOUS BLD VENIPUNCTURE: CPT

## 2022-01-28 PROCEDURE — 2580000003 HC RX 258: Performed by: STUDENT IN AN ORGANIZED HEALTH CARE EDUCATION/TRAINING PROGRAM

## 2022-01-28 PROCEDURE — 2580000003 HC RX 258

## 2022-01-28 PROCEDURE — 80053 COMPREHEN METABOLIC PANEL: CPT

## 2022-01-28 RX ORDER — OXYCODONE HYDROCHLORIDE 5 MG/1
5 TABLET ORAL EVERY 6 HOURS PRN
Qty: 28 TABLET | Refills: 0 | Status: SHIPPED | OUTPATIENT
Start: 2022-01-28 | End: 2022-02-04

## 2022-01-28 RX ORDER — IBUPROFEN 600 MG/1
600 TABLET ORAL EVERY 6 HOURS PRN
Qty: 30 TABLET | Refills: 1 | Status: SHIPPED | OUTPATIENT
Start: 2022-01-28 | End: 2022-02-27

## 2022-01-28 RX ORDER — FERROUS SULFATE 325(65) MG
325 TABLET ORAL 2 TIMES DAILY
Qty: 60 TABLET | Refills: 0 | Status: SHIPPED | OUTPATIENT
Start: 2022-01-28

## 2022-01-28 RX ORDER — ACETAMINOPHEN 500 MG
1000 TABLET ORAL EVERY 6 HOURS PRN
Qty: 30 TABLET | Refills: 1 | Status: SHIPPED | OUTPATIENT
Start: 2022-01-28

## 2022-01-28 RX ORDER — 0.9 % SODIUM CHLORIDE 0.9 %
500 INTRAVENOUS SOLUTION INTRAVENOUS ONCE
Status: COMPLETED | OUTPATIENT
Start: 2022-01-28 | End: 2022-01-28

## 2022-01-28 RX ORDER — POTASSIUM CHLORIDE 20 MEQ/1
40 TABLET, EXTENDED RELEASE ORAL PRN
Status: DISCONTINUED | OUTPATIENT
Start: 2022-01-28 | End: 2022-01-29 | Stop reason: HOSPADM

## 2022-01-28 RX ORDER — POTASSIUM CHLORIDE 7.45 MG/ML
10 INJECTION INTRAVENOUS PRN
Status: DISCONTINUED | OUTPATIENT
Start: 2022-01-28 | End: 2022-01-29 | Stop reason: HOSPADM

## 2022-01-28 RX ORDER — DOCUSATE SODIUM 100 MG/1
100 CAPSULE, LIQUID FILLED ORAL 2 TIMES DAILY
Qty: 60 CAPSULE | Refills: 0 | Status: SHIPPED | OUTPATIENT
Start: 2022-01-28 | End: 2022-02-27

## 2022-01-28 RX ADMIN — POTASSIUM CHLORIDE 40 MEQ: 1500 TABLET, EXTENDED RELEASE ORAL at 12:06

## 2022-01-28 RX ADMIN — KETOROLAC TROMETHAMINE 30 MG: 30 INJECTION, SOLUTION INTRAMUSCULAR; INTRAVENOUS at 00:42

## 2022-01-28 RX ADMIN — IBUPROFEN 600 MG: 600 TABLET, FILM COATED ORAL at 18:32

## 2022-01-28 RX ADMIN — CEPHALEXIN 500 MG: 500 CAPSULE ORAL at 22:46

## 2022-01-28 RX ADMIN — CEPHALEXIN 500 MG: 500 CAPSULE ORAL at 08:17

## 2022-01-28 RX ADMIN — ACETAMINOPHEN 1000 MG: 500 TABLET ORAL at 18:32

## 2022-01-28 RX ADMIN — METRONIDAZOLE 500 MG: 500 TABLET, FILM COATED ORAL at 08:26

## 2022-01-28 RX ADMIN — DOCUSATE SODIUM 100 MG: 100 CAPSULE ORAL at 08:17

## 2022-01-28 RX ADMIN — ENOXAPARIN SODIUM 40 MG: 100 INJECTION SUBCUTANEOUS at 08:18

## 2022-01-28 RX ADMIN — SODIUM CHLORIDE 500 ML: 9 INJECTION, SOLUTION INTRAVENOUS at 02:44

## 2022-01-28 RX ADMIN — Medication 1 TABLET: at 08:17

## 2022-01-28 RX ADMIN — IBUPROFEN 600 MG: 600 TABLET, FILM COATED ORAL at 12:07

## 2022-01-28 RX ADMIN — DOCUSATE SODIUM 100 MG: 100 CAPSULE ORAL at 22:46

## 2022-01-28 RX ADMIN — ACETAMINOPHEN 1000 MG: 500 TABLET ORAL at 12:08

## 2022-01-28 RX ADMIN — GENTAMICIN SULFATE 158.4 MG: 40 INJECTION, SOLUTION INTRAMUSCULAR; INTRAVENOUS at 00:42

## 2022-01-28 RX ADMIN — POLYETHYLENE GLYCOL 3350 17 G: 17 POWDER, FOR SOLUTION ORAL at 08:17

## 2022-01-28 RX ADMIN — ACETAMINOPHEN 1000 MG: 500 TABLET ORAL at 05:29

## 2022-01-28 RX ADMIN — METRONIDAZOLE 500 MG: 500 TABLET, FILM COATED ORAL at 22:46

## 2022-01-28 RX ADMIN — KETOROLAC TROMETHAMINE 30 MG: 30 INJECTION, SOLUTION INTRAMUSCULAR; INTRAVENOUS at 06:38

## 2022-01-28 RX ADMIN — CLINDAMYCIN PHOSPHATE 900 MG: 900 INJECTION, SOLUTION INTRAVENOUS at 01:22

## 2022-01-28 RX ADMIN — NIFEDIPINE 30 MG: 30 TABLET, EXTENDED RELEASE ORAL at 08:17

## 2022-01-28 ASSESSMENT — PAIN SCALES - GENERAL
PAINLEVEL_OUTOF10: 2
PAINLEVEL_OUTOF10: 3
PAINLEVEL_OUTOF10: 3

## 2022-01-28 NOTE — CARE COORDINATION
POST-PARTUM TRANSITIONAL CARE PLAN    38 weeks gestation of pregnancy [Z3A.38]    Writer met w/ Joya at bedside to discuss DCP. She is S/P C/S on 2022 @ 24-20-52-61 at 38w2d of Male    Infant name on BC: Thomas Magallon. Infant to NICU   Infant PCP Unsure, List provided. FOB: [address] [facility] verified name/address/phone number correct on [employer] correct. Writer notified Jenni Cornejo she has 30 days from date of birth to add  to insurance policy. She verbalized understanding. DME: none  HOME CARE: none    No Home Care or DME anticipated. Anticipate DC 2022    CM continue to follow for any DC needs.

## 2022-01-28 NOTE — FLOWSHEET NOTE
IUPC d/c'd at this time. Carpio inserted and taken to OR#1 per bed. Uncomfortable with contractions. Support given.

## 2022-01-28 NOTE — CARE COORDINATION
Social Work    SW consulted for late/insufficent PNC. SW spoke with mom and dad briefly to explain SW role, discuss if there were any needs or concerns, and to provide safe sleep education. Mom informs baby has a safe place to sleep (dulce anaya). Mom denied any s/s of anxiety and depression is aware to reach out to [facility] and support system if any arise. Mom reports a good support system which includes FOB, her mom, and his mom. FOB present in room. Mom states she resides with her mom, step dad, and step dad's son. Mom is linked with WIC and [employer]    Mom states she has no barriers and has transportation. Per moms report she is receiving a list from the doctor to choose pediatrician. SW encouraged parents to reach out if any issues or concerns arise.              Belinda Koehler, Social Work Intern

## 2022-01-28 NOTE — PROGRESS NOTES
POST OPERATIVE DAY # 1    Tristan Bond is a 22 y.o. female   This patient was seen and examined today. PLTCS 1/27/22    Her pregnancy was complicated by:   Patient Active Problem List   Diagnosis    No prenatal care in current pregnancy in second trimester    Obesity    cHTN (no meds) w/ BUTCH w/o SF    Chlamydia infection     FGR (Dx 11/15/21)    Elevated 1hr GTT    Dysmenorrhea    38 weeks gestation of pregnancy    Chorioamnionitis (G1)    Arrest of descent (G1)    PLTCS 1/27/22 M Apg 1/3/3/3/7 Wt 6#5        Today she is doing well without any chief complaint. Her lochia is light. She denies chest pain, shortness of breath, headache, lightheadedness, blurred vision, peripheral edema and palpitations. She is  breast feeding and she denies any signs or symptoms of mastitis. She is ambulating well. She is voiding without difficulty. She currently denies S/S of postpartum depression. Flatus present. Bowel movement absent. She is tolerating solids.     Vital Signs:  Vitals:    01/27/22 1900 01/27/22 1950 01/28/22 0042 01/28/22 0445   BP: (!) 152/93 (!) 152/97 130/86 120/82   Pulse: 87 98 103 115   Resp: 20 18 20 18   Temp:  99.9 °F (37.7 °C) 99.9 °F (37.7 °C) 98.7 °F (37.1 °C)   TempSrc:  Oral Oral Oral   SpO2: 97% 96% 97% 98%   Weight:       Height:           Urine Input & Output last 24hrs:     Intake/Output Summary (Last 24 hours) at 1/28/2022 0645  Last data filed at 1/28/2022 0445  Gross per 24 hour   Intake 4763.5 ml   Output 1030 ml   Net 3733.5 ml       Physical Exam:  General:  no apparent distress, alert and cooperative  Neurologic:  alert, oriented, normal speech, no focal findings  Lungs:  No increased work of breathing, good air exchange, clear to auscultation bilaterally, no crackles or wheezing  Heart:  normal S1 and S2 and regular rate and rhythm    Abdomen: abdomen soft, non-distended, non-tender  Fundus: non-tender, normal size, firm, below umbilicus  Incision: clean silver dressing in place  Extremities:  no calf tenderness, non edematous     Labs:  Lab Results   Component Value Date    WBC 10.7 2022    HGB 10.8 (L) 2022    HCT 32.8 (L) 2022    MCV 87.9 2022     2022       Assessment/Plan:  1. Nathalie Alvarado is a  POD # 1 s/p PLTCS cHTN (no meds) now diagnosed with BUTCH without severe features and started on antihypertensives   - Doing well  - VSS, Afebrile   - male infant in NICU   - Encourage ambulation and use of incentive spirometer   - Keflex/Flagyl x 48 hrs    - Lovenox 40 mg QD   - Duramorph/Toradol    - Motrin/Tylenol/Natalya   - D/C roberts catheter and saline lock IV on POD #1    - CBC awaiting   - Denies s/s PreE   - Blood pressures consistently elevated. Started on Procardia 30 XL qd   - CBC, CMP pending this AM   - Continue to monitor   2. Rh positive/Rubella immune  3. Breast feeding    - Denies s/s mastitis at this time  4. Chorioamnionitis    - Tmax 101.8 @ 1400 on . Last fever 100.4 at 1440 on    - Amp/Gent/Clinda x2 doses (1 pre-del/1 post-del)    - Abdomen appropriately tender   - No purulent vaginal discharge or odor   5. Low urine output   - 500 cc bolus given - resolved  6. Late/Insuficient PNC   - Intake at 22w4d   - Social work PP  7. Anemia   - Hgb 10.8   - CBC on POD#1 pending   - Clinically asymptomatic   - Continue to monitor   8. Continue post-op care. Counseling Completed:  Secondary Smoke risks and Sudden Infant Death Syndrome were reviewed with recommendations. Infant sleeping, \"back to sleep\" and avoidance of co-sleeping recommendations were reviewed. Signs and Symptoms of Post Partum Depression were reviewed. The patient is to call if any occur. Signs and symptoms of Mastitis were reviewed. The patient is to call if any occur for follow up.   Discharge instructions including pelvic rest, incision care, 15 lb weight restriction, no driving with pain medicine and office follow-up were reviewed with patient Attending Physician: Dr. Amanda Dodd DO  Ob/Gyn Resident  2022, 6:45 AM     Date: 2022  Time: 1:30 PM      Patient Name: Luis Jeffers  Patient : 2000  Room/Bed: 5801/8472-18  Admission Date/Time: 2022 10:25 PM        Attending Physician Statement  I have discussed the care of Luis Jeffers, including pertinent history and exam findings with the resident. I have reviewed and edited their note in the electronic medical record. The key elements of all parts of the encounter have been performed/reviewed by me . I agree with the assessment, plan and orders as documented by the resident. The level of care submitted represents to the best of my ability the care documented in the medical record today. GC Modifier. This service has been performed in part by a resident under the direction of a teaching physician. POD#1 doing well. BUTCH without evidence of severe features. BPs well controlled on Procardia 30mg XL. Will continue to monitor for evidence of severe features. Joya and I spoke at length and debriefed regarding her delivery. She is coping incredibly well and said she is trying to take each day hour by hour and just hoping with everything she has that Murali Lennox will be ok. I told Manan Montes that she has the support of both departments and to please let us know if there is anything we can do to help her through this difficult time. I also asked her to please extend our support and well wishes to her partner Murali Lennox whom was not in the room when I came to round. Will follow up with the outpatient team at [facility] to implement any and all resources available to support this family during this difficult time.      Attending's Name:  Delmer Saunders DO

## 2022-01-28 NOTE — PLAN OF CARE
Problem: Fluid Volume - Imbalance:  Goal: Absence of imbalanced fluid volume signs and symptoms  Description: Absence of imbalanced fluid volume signs and symptoms  Outcome: Ongoing  Goal: Absence of postpartum hemorrhage signs and symptoms  Description: Absence of postpartum hemorrhage signs and symptoms  Outcome: Ongoing     Problem: Infection - Intrapartum Infection:  Goal: Will show no infection signs and symptoms  Description: Will show no infection signs and symptoms  Outcome: Ongoing     Problem: Pain - Acute:  Goal: Pain level will decrease  Description: Pain level will decrease  Outcome: Ongoing     Problem: Urinary Retention:  Goal: Experiences of bladder distention will decrease  Description: Experiences of bladder distention will decrease  Outcome: Ongoing     Problem: Pain:  Goal: Pain level will decrease  Description: Pain level will decrease  Outcome: Ongoing     Problem: Discharge Planning:  Goal: Discharged to appropriate level of care  Description: Discharged to appropriate level of care  Outcome: Ongoing     Problem: Infection - Surgical Site:  Goal: Will show no infection signs and symptoms  Description: Will show no infection signs and symptoms  Outcome: Ongoing     Problem: Mood - Altered:  Goal: Mood stable  Description: Mood stable  Outcome: Ongoing     Problem: Nausea/Vomiting:  Goal: Absence of nausea/vomiting  Description: Absence of nausea/vomiting  Outcome: Ongoing

## 2022-01-28 NOTE — FLOWSHEET NOTE
Patient admitted to room 746 from l&d via stretcher. Oriented to room and surroundings. Plan of care reviewed. Verbalized understanding. Instructed on infant security and safe sleep practices. Preventing falls education provided . The following handouts given: A New Beginning: Your Guide to Postpartum Care, Rounding, gs Security System,Babies Cry A lot, Safe Sleep, Security and Visitation Guidelines. Call light placed within reach.

## 2022-01-29 VITALS
SYSTOLIC BLOOD PRESSURE: 132 MMHG | DIASTOLIC BLOOD PRESSURE: 79 MMHG | TEMPERATURE: 98.4 F | RESPIRATION RATE: 16 BRPM | WEIGHT: 233 LBS | HEIGHT: 65 IN | OXYGEN SATURATION: 99 % | HEART RATE: 98 BPM | BODY MASS INDEX: 38.82 KG/M2

## 2022-01-29 PROBLEM — Z3A.38 38 WEEKS GESTATION OF PREGNANCY: Status: RESOLVED | Noted: 2022-01-25 | Resolved: 2022-01-29

## 2022-01-29 PROCEDURE — 6370000000 HC RX 637 (ALT 250 FOR IP): Performed by: STUDENT IN AN ORGANIZED HEALTH CARE EDUCATION/TRAINING PROGRAM

## 2022-01-29 PROCEDURE — 6360000002 HC RX W HCPCS: Performed by: STUDENT IN AN ORGANIZED HEALTH CARE EDUCATION/TRAINING PROGRAM

## 2022-01-29 RX ORDER — NIFEDIPINE 30 MG/1
30 TABLET, EXTENDED RELEASE ORAL DAILY
Qty: 90 TABLET | Refills: 1 | Status: SHIPPED | OUTPATIENT
Start: 2022-01-29

## 2022-01-29 RX ADMIN — CEPHALEXIN 500 MG: 500 CAPSULE ORAL at 14:07

## 2022-01-29 RX ADMIN — ENOXAPARIN SODIUM 40 MG: 100 INJECTION SUBCUTANEOUS at 08:42

## 2022-01-29 RX ADMIN — ACETAMINOPHEN 1000 MG: 500 TABLET ORAL at 00:37

## 2022-01-29 RX ADMIN — IBUPROFEN 600 MG: 600 TABLET, FILM COATED ORAL at 00:37

## 2022-01-29 RX ADMIN — IBUPROFEN 600 MG: 600 TABLET, FILM COATED ORAL at 06:54

## 2022-01-29 RX ADMIN — ACETAMINOPHEN 1000 MG: 500 TABLET ORAL at 12:52

## 2022-01-29 RX ADMIN — METRONIDAZOLE 500 MG: 500 TABLET, FILM COATED ORAL at 14:07

## 2022-01-29 RX ADMIN — IBUPROFEN 600 MG: 600 TABLET, FILM COATED ORAL at 12:52

## 2022-01-29 RX ADMIN — ACETAMINOPHEN 1000 MG: 500 TABLET ORAL at 06:54

## 2022-01-29 RX ADMIN — DOCUSATE SODIUM 100 MG: 100 CAPSULE ORAL at 08:41

## 2022-01-29 RX ADMIN — POLYETHYLENE GLYCOL 3350 17 G: 17 POWDER, FOR SOLUTION ORAL at 08:41

## 2022-01-29 RX ADMIN — Medication 1 TABLET: at 08:41

## 2022-01-29 RX ADMIN — CEPHALEXIN 500 MG: 500 CAPSULE ORAL at 06:54

## 2022-01-29 RX ADMIN — METRONIDAZOLE 500 MG: 500 TABLET, FILM COATED ORAL at 06:54

## 2022-01-29 RX ADMIN — NIFEDIPINE 30 MG: 30 TABLET, EXTENDED RELEASE ORAL at 08:59

## 2022-01-29 ASSESSMENT — PAIN SCALES - GENERAL
PAINLEVEL_OUTOF10: 1
PAINLEVEL_OUTOF10: 4
PAINLEVEL_OUTOF10: 5

## 2022-01-29 NOTE — PROGRESS NOTES
CLINICAL PHARMACY NOTE: MEDS TO BEDS    Total # of Prescriptions Filled: 6   The following medications were delivered to the patient:  · dok 100  · ibuprofen 600  · Acetaminophen 500  · fersoul 325  · oxycodoen 5  · Nifedipine 30    Additional Documentation:  Left in room with pt

## 2022-01-29 NOTE — PLAN OF CARE
Problem: Anxiety:  Goal: Level of anxiety will decrease  Description: Level of anxiety will decrease  Outcome: Ongoing     Problem: Breathing Pattern - Ineffective:  Goal: Able to breathe comfortably  Description: Able to breathe comfortably  Outcome: Ongoing     Problem: Fluid Volume - Imbalance:  Goal: Absence of imbalanced fluid volume signs and symptoms  Description: Absence of imbalanced fluid volume signs and symptoms  Outcome: Ongoing  Goal: Absence of intrapartum hemorrhage signs and symptoms  Description: Absence of intrapartum hemorrhage signs and symptoms  Outcome: Ongoing  Goal: Absence of postpartum hemorrhage signs and symptoms  Description: Absence of postpartum hemorrhage signs and symptoms  Outcome: Ongoing     Problem: Infection - Intrapartum Infection:  Goal: Will show no infection signs and symptoms  Description: Will show no infection signs and symptoms  Outcome: Ongoing     Problem: Labor Process - Prolonged:  Goal: Labor progression, first stage, within specified pattern  Description: Labor progression, first stage, within specified pattern  Outcome: Ongoing  Goal: Labor progession, second stage, within specified pattern  Description: Labor progession, second stage, within specified pattern  Outcome: Ongoing  Goal: Uterine contractions within specified parameters  Description: Uterine contractions within specified parameters  Outcome: Ongoing     Problem:  Screening:  Goal: Ability to make informed decisions regarding treatment has improved  Description: Ability to make informed decisions regarding treatment has improved  Outcome: Ongoing     Problem: Pain - Acute:  Goal: Pain level will decrease  Description: Pain level will decrease  Outcome: Ongoing  Goal: Able to cope with pain  Description: Able to cope with pain  Outcome: Ongoing     Problem: Tissue Perfusion - Uteroplacental, Altered:  Goal: Absence of abnormal fetal heart rate pattern  Description: Absence of abnormal fetal heart rate pattern  Outcome: Ongoing     Problem: Urinary Retention:  Goal: Experiences of bladder distention will decrease  Description: Experiences of bladder distention will decrease  Outcome: Ongoing  Goal: Urinary elimination within specified parameters  Description: Urinary elimination within specified parameters  Outcome: Ongoing     Problem: Pain:  Goal: Pain level will decrease  Description: Pain level will decrease  Outcome: Ongoing  Goal: Control of acute pain  Description: Control of acute pain  Outcome: Ongoing  Goal: Control of chronic pain  Description: Control of chronic pain  Outcome: Ongoing     Problem: Discharge Planning:  Goal: Discharged to appropriate level of care  Description: Discharged to appropriate level of care  Outcome: Ongoing     Problem: Mood - Altered:  Goal: Mood stable  Description: Mood stable  Outcome: Ongoing     Problem: Nausea/Vomiting:  Goal: Absence of nausea/vomiting  Description: Absence of nausea/vomiting  Outcome: Ongoing     Problem: Venous Thromboembolism:  Goal: Will show no signs or symptoms of venous thromboembolism  Description: Will show no signs or symptoms of venous thromboembolism  Outcome: Ongoing  Goal: Absence of signs or symptoms of impaired coagulation  Description: Absence of signs or symptoms of impaired coagulation  Outcome: Ongoing

## 2022-01-29 NOTE — PROGRESS NOTES
Obstetric/Gynecology Resident Interval Note    Notified by RN that patient would like to be discharged as baby will be transferred to [facility] for further care. Pt seen and evaluated. Pt denies any s/s PreE and she is tolerating oral intake, urinating well, bleeding is light, and pain is controlled. She is passing flatus without difficulty. BPs stable. Discussed continuing daily Procardia 30 XL qd and strict return precautions for PreE sx were given. Discharge instructions reviewed with patient. Pt has f/u 2/3/21 at [facility] and pt was advised to call if appt cannot be kept as she may be down in [facility] at that time. Discussed the possibility for virtual telephone visits and eventually an in person visit for postpartum care. Pt reports she feels well supported and she denies any SI/HI. Offered mental health resources and patient expressed appreciation. All questions and concerns were answered to the best of my ability. Vitals:    01/28/22 1600 01/28/22 2030 01/29/22 0347 01/29/22 0800   BP: 121/82 126/82 (!) 137/94 135/82   Pulse: 106 86 106 107   Resp: 18 18 18 18   Temp: 98.7 °F (37.1 °C) 98.2 °F (36.8 °C) 98.4 °F (36.9 °C) 98.6 °F (37 °C)   TempSrc:  Oral Oral Oral   SpO2:  99% 98% 98%   Weight:       Height:         Dr. Norma Pickett updated and agreeable. Will discharge pt at this time.     DO MARK Pizarro Resident, [facility] 812  1/29/2022

## 2022-01-29 NOTE — PLAN OF CARE
Problem: Anxiety:  Goal: Level of anxiety will decrease  Description: Level of anxiety will decrease  1/29/2022 0126 by Sherryle Becton, RN  Outcome: Ongoing     Problem: Breathing Pattern - Ineffective:  Goal: Able to breathe comfortably  Description: Able to breathe comfortably  1/29/2022 0126 by Sherryle Becton, RN  Outcome: Ongoing     Problem: Fluid Volume - Imbalance:  Goal: Absence of imbalanced fluid volume signs and symptoms  Description: Absence of imbalanced fluid volume signs and symptoms  1/29/2022 0830 by Danny Luna RN  Outcome: Ongoing  1/29/2022 0126 by Sherryle Becton, RN  Outcome: Ongoing  Goal: Absence of intrapartum hemorrhage signs and symptoms  Description: Absence of intrapartum hemorrhage signs and symptoms  1/29/2022 0830 by Danny Luna RN  Outcome: Ongoing  1/29/2022 0126 by Sherryle Becton, RN  Outcome: Ongoing  Goal: Absence of postpartum hemorrhage signs and symptoms  Description: Absence of postpartum hemorrhage signs and symptoms  1/29/2022 0830 by Danny Luna RN  Outcome: Ongoing  1/29/2022 0126 by Sherryle Becton, RN  Outcome: Ongoing     Problem: Infection - Intrapartum Infection:  Goal: Will show no infection signs and symptoms  Description: Will show no infection signs and symptoms  1/29/2022 0830 by Danny Luna RN  Outcome: Ongoing  1/29/2022 0126 by Sherryle Becton, RN  Outcome: Ongoing     Problem: Labor Process - Prolonged:  Goal: Labor progression, first stage, within specified pattern  Description: Labor progression, first stage, within specified pattern  1/29/2022 0126 by Sherryle Becton, RN  Outcome: Ongoing  Goal: Labor progession, second stage, within specified pattern  Description: Labor progession, second stage, within specified pattern  1/29/2022 0126 by Sherryle Becton, RN  Outcome: Ongoing  Goal: Uterine contractions within specified parameters  Description: Uterine contractions within specified parameters  1/29/2022 0126 by Arslan Goldberg RN  Outcome: Ongoing     Problem:  Screening:  Goal: Ability to make informed decisions regarding treatment has improved  Description: Ability to make informed decisions regarding treatment has improved  2022 012 by Arslan Goldberg RN  Outcome: Ongoing     Problem: Pain - Acute:  Goal: Pain level will decrease  Description: Pain level will decrease  2022 0830 by Melva Grider RN  Outcome: Ongoing  2022 012 by Arslan Goldberg RN  Outcome: Ongoing  Goal: Able to cope with pain  Description: Able to cope with pain  2022 0830 by Melva Grider RN  Outcome: Ongoing  2022 0126 by Arslan Goldberg RN  Outcome: Ongoing     Problem: Tissue Perfusion - Uteroplacental, Altered:  Goal: Absence of abnormal fetal heart rate pattern  Description: Absence of abnormal fetal heart rate pattern  2022 012 by Arslan oGldberg RN  Outcome: Ongoing     Problem: Urinary Retention:  Goal: Experiences of bladder distention will decrease  Description: Experiences of bladder distention will decrease  2022 0830 by Melva Grider RN  Outcome: Ongoing  2022 by Arslan Goldberg RN  Outcome: Ongoing  Goal: Urinary elimination within specified parameters  Description: Urinary elimination within specified parameters  2022 08 by Melva Grider RN  Outcome: Ongoing  2022 by Arslan Goldberg RN  Outcome: Ongoing     Problem: Pain:  Goal: Pain level will decrease  Description: Pain level will decrease  2022 0830 by Melva Grider RN  Outcome: Ongoing  2022 by Arslan Goldberg RN  Outcome: Ongoing  Goal: Control of acute pain  Description: Control of acute pain  2022 0830 by Melva Grider RN  Outcome: Ongoing  2022 012 by Arslan Goldberg RN  Outcome: Ongoing  Goal: Control of chronic pain  Description: Control of chronic pain  2022 08 by Melva Grider RN  Outcome: Ongoing  1/29/2022 0126 by Kym Vargas RN  Outcome: Ongoing     Problem: Discharge Planning:  Goal: Discharged to appropriate level of care  Description: Discharged to appropriate level of care  1/29/2022 0830 by Nathaniel Asencio RN  Outcome: Ongoing  1/29/2022 0126 by Kym Vargas RN  Outcome: Ongoing     Problem: Infection - Surgical Site:  Goal: Will show no infection signs and symptoms  Description: Will show no infection signs and symptoms  1/29/2022 0830 by Nathaniel Asencio RN  Outcome: Ongoing  1/29/2022 0126 by Kym Vargas RN  Outcome: Ongoing     Problem: Mood - Altered:  Goal: Mood stable  Description: Mood stable  1/29/2022 0830 by Nathaniel Asencio RN  Outcome: Ongoing  1/29/2022 0126 by Kym Vargas RN  Outcome: Ongoing     Problem: Nausea/Vomiting:  Goal: Absence of nausea/vomiting  Description: Absence of nausea/vomiting  1/29/2022 0830 by Nathaniel Asencio RN  Outcome: Ongoing  1/29/2022 0126 by Kym Vargas RN  Outcome: Ongoing     Problem: Venous Thromboembolism:  Goal: Will show no signs or symptoms of venous thromboembolism  Description: Will show no signs or symptoms of venous thromboembolism  1/29/2022 0830 by Nathaniel Asencio RN  Outcome: Ongoing  1/29/2022 0126 by Kym Vargas RN  Outcome: Ongoing  Goal: Absence of signs or symptoms of impaired coagulation  Description: Absence of signs or symptoms of impaired coagulation  1/29/2022 0830 by Nathaniel Asencio RN  Outcome: Ongoing  1/29/2022 0126 by Kym Vargas RN  Outcome: Ongoing

## 2022-01-29 NOTE — PROGRESS NOTES
POST OPERATIVE DAY # 2    Corrie Quach is a 22 y.o. female   This patient was seen and examined today. PLTCS on 1/27/22    Her pregnancy was complicated by:   Patient Active Problem List   Diagnosis    No prenatal care in current pregnancy in second trimester    Obesity    cHTN (no meds) w/ BUTCH w/o SF    Chlamydia infection     FGR (Dx 11/15/21)    Elevated 1hr GTT    Dysmenorrhea    38 weeks gestation of pregnancy    Chorioamnionitis (G1)    Arrest of descent (G1)    PLTCS 1/27/22 M Apg 1/3/3/3/7 Wt 6#5        Today she is doing well without any chief complaint. Her lochia is light. She denies chest pain, shortness of breath, headache, lightheadedness, blurred vision and peripheral edema. She is bottle feeding and she denies any signs or symptoms of mastitis. She is ambulating well. She is voiding without difficulty. She currently denies S/S of postpartum depression. Flatus present. Bowel movement present. She is tolerating solids.     Vital Signs:  Vitals:    01/28/22 1200 01/28/22 1600 01/28/22 2030 01/29/22 0347   BP: 124/86 121/82 126/82 (!) 137/94   Pulse: 104 106 86 106   Resp: 18 18 18 18   Temp: 98.3 °F (36.8 °C) 98.7 °F (37.1 °C) 98.2 °F (36.8 °C) 98.4 °F (36.9 °C)   TempSrc:   Oral Oral   SpO2:   99% 98%   Weight:       Height:              Physical Exam:  General:  no apparent distress, alert and cooperative  Neurologic:  alert, oriented, normal speech, no focal findings or movement disorder noted  Lungs:  No increased work of breathing, good air exchange, clear to auscultation bilaterally, no crackles or wheezing  Heart:  Regular rate and rhythm, normal S1 and S2, no S3 or S4, and no murmur noted    Abdomen: abdomen soft, non-distended, non-tender, bowel sounds present   Fundus: non-tender, firm, below umbilicus  Incision: silver dressing in place   Extremities:  no calf tenderness, non edematous    Labs:  Lab Results   Component Value Date    WBC 22.3 (H) 01/28/2022    HGB 9.9 (L) 2022    HCT 29.7 (L) 2022    MCV 89.2 2022     2022       Assessment/Plan:  1. Leora Navarrete is a  POD # 2 s/p PLTCS   - Doing well, VSS    - Male infant in NICU, circumcision desired    - Encourage ambulation and use of incentive spirometer   - COVID19 negative 22  2. Rh positive/Rubella immune  3. Bottle feeding once baby is tolerated intake   4. Hypokalemia   - K 3.2  - K replacement ordered   5. 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 Chorioamnionitis   - Last fever was 100.4 22 @ 1440, fever max was 101.8 on 22  - Denies any fundal tenderness, abnormal vaginal discharge, fevers, chills   - S/p Amp, gent, clinda   7. Low Urine Output   - Resolved   - S/p 500 mL fluid bolus   - Urinating without any issues   8. Late and Insufficient Prenatal Care  - Did not present for care until 22 weeks   - Social work consulted   9. Anemia  - Hgb 10.8>9.9  - Denies any s/s of anemia   - Hemodynamically stable   - Iron supplementation printed for discharge   - S/p TXA and Hemabate intra op   10. BMI 38   - Silver dressing needs removed one week from surgery   - Keflex, flagyl x 48 hours for wound infection prophylaxis   - Lovenox 40 mg QD for DVT prophylaxis   11. Dysmenorrhea   - Patient desires Mirena IUD but due to chorio, will wait to place IUD until her 6 week post partum appointment   12. Continue post-op care. Counseling Completed:  Secondary Smoke risks and Sudden Infant Death Syndrome were reviewed with recommendations. Infant sleeping, \"back to sleep\" and avoidance of co-sleeping recommendations were reviewed. Signs and Symptoms of Post Partum Depression were reviewed. The patient is to call if any occur. Signs and symptoms of Mastitis were reviewed.  The patient is to call if any occur for follow up.   Discharge instructions including pelvic rest, incision care, 15 lb weight restriction, no driving with pain medicine and office follow-up were reviewed with patient     Attending Physician: Dr. Merritt Mercado DO  Ob/Gyn Resident  1/29/2022, 5:26 AM

## 2022-01-31 LAB — SURGICAL PATHOLOGY REPORT: NORMAL

## 2022-02-10 ENCOUNTER — VIRTUAL VISIT (OUTPATIENT)
Dept: OBGYN | Age: 22
End: 2022-02-10

## 2022-02-10 PROCEDURE — 99024 POSTOP FOLLOW-UP VISIT: CPT | Performed by: STUDENT IN AN ORGANIZED HEALTH CARE EDUCATION/TRAINING PROGRAM

## 2022-02-10 NOTE — PROGRESS NOTES
Maite Ng is a 22 y.o. female evaluated via telephone on 2/10/2022. Consent:  She and/or health care decision maker is aware that that she may receive a bill for this telephone service, which includes applicable co-pays, depending on her insurance coverage, and has provided verbal consent to proceed. Documentation:  I communicated with the patient and/or health care decision maker about postpartum course. Details of this discussion including any medical advice provided: Maite Ng  2:13 PM  2/10/22            The patient was evaluated via telephone. She has no chief complaints today. She delivered by  section on 22. Labor and delivery course was complicated by cHTN with BUTCH without SF. She was started on Procardia 30XL prior to discharge and she reports compliance with the medication. She also met criteria for chorioamnionitis and was treated with Ampicillin, Gentamicin, and Clindamycin. She denies any fevers/chills/neausea/vomiting. She reports incision is healing well and is C/D/I. She denies HA, CP, SOB, LE swelling, and RUQ pain. She did report an episode of some blurry vision that has now resolved and has not continued. She has not had her BP checked since discharge. Advised getting BP checked today at closest facility, and patient stated she would. She was informed that she could call us with any results. She was advised to report immediately to ER if she experiences any s/s of preE. She is not breast feeding and there is not any signs or symptoms of mastitis. The patient completed the E.P.D.S. Evaluation form and scored 0. She does not have any signs or symptoms of post partum depression. She denies any suicidal thoughts with a plan, intent to harm others and delusional ideas. Today her lochia is light she denies any dizziness or shortness of breath.       Patient is currently residing in [place] and she had silver dressing removed at a facility there. She reports she will not be back in [facility] for another 1-2 weeks. Advised patient to follow up for in person visit in the office as soon as possible. Advised patient to have blood pressure checked today at closest facility. Patient verbalized understanding. Her pregnancy was complicated by:   Patient Active Problem List    Diagnosis Date Noted    PLTCS 22 M Apg 1/3/3/3/7 Wt 6#5  2022    Chorioamnionitis (G1) 2022    Arrest of descent (G1) 2022     Overview Note:     Complete and pushed for two hours without fetal descent      Dysmenorrhea 01/10/2022     Overview Note:     Desires PP IUD      Elevated 1hr GTT 2021     Overview Note:     3 hr GTT ordered      FGR (Dx 11/15/21) 2021     Overview Note:     AC<10%ile, Total 48%ile    Per attending, recommend delivery at 37w0d. Per ACOG, window is 34w-37w6      cHTN (no meds) w/ BUTCH w/o SF 10/13/2021     Overview Note:     New dx. Baseline PreE labs wnl () during ED visit. Baseline P/C 0.13 (10/13). Met criteria for BUTCH w/o SF during admission for labor d/t a P/C 1.39      Chlamydia infection  10/13/2021     Overview Note:     Positive 21. TOR neg 10/13      No prenatal care in current pregnancy in second trimester 10/11/2021     Overview Note:     Ga 22w4d at time of ob intake       Obesity 10/11/2021     Overview Note:     Early GTT wnl at 22w6d           She does admit to having good home support. Her bowels are regular and she denies any urinary tract symptomology. OB History    Para Term  AB Living   1 1 1 0 0 1   SAB IAB Ectopic Molar Multiple Live Births   0 0 0 0 0 1             Assessment:   Diagnosis Orders   1. Postpartum care and examination       Chief Complaint   Patient presents with    Postpartum Care     EPDS Score of 0        Plan:  1. Return to the office as soon as possible  2. Signs & Symptoms of mastitis reviewed; notify if occurs  3. Secondary smoke risks reviewed. Increased risks of respiratory problems, Sudden     infant death syndrome, and potential malignancies. 4. Abstinence  5. Family planning counseling and STD counseling completed  6. Continue with post operative restrictions  7. No lifting or Atlas  8. Diagnosis Orders   1. Postpartum care and examination         I affirm this is a Patient Initiated Episode with a Patient who has not had a related appointment within my department in the past 7 days or scheduled within the next 24 hours. Patient identification was verified at the start of the visit: Yes    Total Time: minutes: 5-10 minutes    Joya Spain Vanessa was evaluated through a synchronous (real-time) audio encounter. The patient was located at home in a state where the provider was licensed to provide care.     Note: not billable if this call serves to triage the patient into an appointment for the relevant concern      Adama Randall DO

## 2022-02-21 ENCOUNTER — TELEPHONE (OUTPATIENT)
Dept: OBGYN | Age: 22
End: 2022-02-21

## 2022-02-21 PROBLEM — I10 ESSENTIAL HYPERTENSION: Status: ACTIVE | Noted: 2022-02-21

## 2022-03-15 ENCOUNTER — POSTPARTUM VISIT (OUTPATIENT)
Dept: OBGYN | Age: 22
End: 2022-03-15
Payer: COMMERCIAL

## 2022-03-15 VITALS
HEIGHT: 65 IN | BODY MASS INDEX: 38.49 KG/M2 | SYSTOLIC BLOOD PRESSURE: 125 MMHG | WEIGHT: 231 LBS | DIASTOLIC BLOOD PRESSURE: 80 MMHG | HEART RATE: 74 BPM

## 2022-03-15 DIAGNOSIS — I10 CHRONIC HYPERTENSION: ICD-10-CM

## 2022-03-15 DIAGNOSIS — O41.1290 ANTEPARTUM CHORIOAMNIONITIS, SINGLE OR UNSPECIFIED FETUS: ICD-10-CM

## 2022-03-15 NOTE — PROGRESS NOTES
Donamber Blinks  1:56 PM  3/15/22            The patient was seen. She has no chief complaints today. She delivered by  section on 22. Her pregnancy was complicated by cHTN with BUTCH w/o SF. She was started on Procardia 30XL prior to discharge and reports compliance with medication. She also met criteria for chorioamnionitis and was treated with Ampicillin, Gentamicin, and Clindamycin. She has not had any fevers/chills/nausea/vomitting. She is not breast feeding and there is not any signs or symptoms of mastitis. The patient completed the E.P.D.S. Evaluation form and scored 0. She does not have any signs or symptoms of post partum depression. She denies any suicidal thoughts with a plan, intent to harm others and delusional ideas. Today her lochia is light she denies any dizziness or shortness of breath. Patient does report that baby is discharged home with close follow up and is doing better than expected. She does feel like she has good support at home. Her pregnancy was complicated by:   Patient Active Problem List    Diagnosis Date Noted   James Sabillon 2022    PLTCS 22 M Apg 1/3/3/3/7 Wt 6#5  2022    Hx Chorioamnionitis (G1) 2022    Hx C/S x 1 2022     Overview Note:     Complete and pushed for two hours without fetal descent      Dysmenorrhea 01/10/2022     Overview Note:     Desires PP IUD      Hx FGR (Dx 11/15/21) 2021     Overview Note:     AC<10%ile, Total 48%ile    Per attending, recommend delivery at 37w0d. Per ACOG, window is 34w-37w6      Hx cHTN (no meds) w/ BUTCH w/o SF 10/13/2021     Overview Note:     New dx. Baseline PreE labs wnl () during ED visit. Baseline P/C 0.13 (10/13). Met criteria for BUTCH w/o SF during admission for labor d/t a P/C 1.39      Hx Chlamydia 10/13/2021     Overview Note:     Positive 21.  TOR neg 10/13      Obesity 10/11/2021     Overview Note:     Early GTT wnl at 22w6d           She does admit to having good home support. Her bowels are regular and she denies any urinary tract symptomology. OB History    Para Term  AB Living   1 1 1 0 0 1   SAB IAB Ectopic Molar Multiple Live Births   0 0 0 0 0 1           Blood pressure 125/80, pulse 74, height 5' 5\" (1.651 m), weight 231 lb (104.8 kg), last menstrual period 2021, not currently breastfeeding. Abdomen: Soft and non-tender; good bowel sounds; no guarding, rebound or rigidity; no CVA tenderness bilaterally. Incision: Clean, Dry and Intact without signs or symptoms of infection. Extremities: No calf tenderness bilaterally. DTR 2/4 bilaterally. No edema. Assessment:   Diagnosis Orders   1. Postpartum care and examination     2. Hx cHTN (no meds) w/ BUTCH w/o SF     3. Antepartum chorioamnionitis, single or unspecified fetus       Chief Complaint   Patient presents with    Postpartum Care     6 wk pp, pain here and there in the abdomin      EPDS Score of 0        Plan:  1. Return to the office in for annual exam  2. Signs & Symptoms of mastitis reviewed; notify if occurs  3. Secondary smoke risks reviewed. Increased risks of respiratory problems, Sudden     infant death syndrome, and potential malignancies. 4. Patient may return to activity as tolerated  5. Family planning counseling and STD counseling completed      Patient was seen with total face to face time of 20 minutes. More than 50% of this visit was on counseling and education regarding her    Diagnosis Orders   1. Postpartum care and examination     2. Hx cHTN (no meds) w/ BUTCH w/o SF     3. Antepartum chorioamnionitis, single or unspecified fetus      and her options. She was also counseled on her preventative health maintenance recommendations and follow-up. \    Date: 3/15/2022  Time: 4:09 PM      Patient Name: Richie Prado  Patient : 2000  Room/Bed: Room/bed info not found  Admission Date/Time: No admission date for patient encounter. Attending Physician Statement  I have discussed the care of Jonathon Caicedo, including pertinent history and exam findings with the resident. I have reviewed and edited their note in the electronic medical record. The key elements of all parts of the encounter have been performed/reviewed by me . I agree with the assessment, plan and orders as documented by the resident. The level of care submitted represents to the best of my ability the care documented in the medical record today. GC Modifier. This service has been performed in part by a resident under the direction of a teaching physician. Checked in with oJya to see how she was coping and inquire about how baby Chinmay Praeugenio was doing. She said she is well supported by an excellent care team with home care and her family has been amazingly supportive. She is connected with a counselor who has been helpful. She takes it day by day and is doing an extraordinary job coping. Wil Rdz said that the genetics team at [facility] does not believe that they have identified a genetic etiology for Carie's current condition. From genetics at [facility] \"mitochondrial genome sequencing was non-diagnostic. There were two variants of uncertain significance reported that are in regions with no reported disease association. To our current knowledge, these two variants are not of concern. \" Two gene variants were identified from  crisis panel (PARS2, ITGB3) with unclear clinical significance and unlikely to be the etiology of Carie's heart failure. Understandably, it is very hard for Joya and Carie Curiel. to not have an answer. She will return in 5 weeks for possible PPFP and knows to reach out if there is anything we can do to support her and her family.          Attending's Name:  Mj Lam, DO

## 2024-07-09 ENCOUNTER — HOSPITAL ENCOUNTER (EMERGENCY)
Age: 24
Discharge: LEFT BEFORE DISCHARGE | End: 2024-07-09
Payer: COMMERCIAL

## 2024-07-09 DIAGNOSIS — Z53.21 ELOPED FROM EMERGENCY DEPARTMENT: Primary | ICD-10-CM

## 2024-07-09 PROCEDURE — 99283 EMERGENCY DEPT VISIT LOW MDM: CPT

## 2024-07-09 ASSESSMENT — ENCOUNTER SYMPTOMS
NAUSEA: 0
SHORTNESS OF BREATH: 0
COUGH: 0
VOMITING: 0
ABDOMINAL PAIN: 0
BACK PAIN: 0
DIARRHEA: 0

## 2024-07-09 NOTE — ED PROVIDER NOTES
[facility]     Emergency Department     Faculty Attestation     reviewed the resident’s note and agree with the documented findings and plan of care. Any areas of disagreement are noted on the chart. I was personally present for the key portions of any procedures. I have documented in the chart those procedures where I was not present during the key portions. I have reviewed the emergency nurses triage note. I agree with the chief complaint, past medical history, past surgical history, allergies, medications, social and family history as documented unless otherwise noted below.    For Physician Assistant/ Nurse Practitioner cases/documentation I have personally evaluated this patient and have completed at least one if not all key elements of the E/M (history, physical exam, and MDM). Additional findings are as noted.      Primary Care Physician:  Physician, Non-Staff (Inactive)    CHIEF COMPLAINT     No chief complaint on file.      RECENT VITALS:    ,   ,  ,      LABS:  Labs Reviewed - No data to display    Radiology  No orders to display         Attending Physician Additional  Notes    The patient was brought to our facility by EMS.  The patient was adamant that she did not want to be seen at our facility and wants to be seen at [facility].  Upon arrival the patient immediately eloped.  I did not have the opportunity to interview or examine the patient.            Sarah Childers DO,  DO  Attending Emergency Physician            Sarah Childers,   07/09/24 [id number]    
Partner Violence: Not on file   Housing Stability: Not on file       Family History   Problem Relation Age of Onset    No Known Problems Paternal Grandfather     Diabetes Paternal Grandmother     No Known Problems Maternal Grandmother     No Known Problems Maternal Grandfather     No Known Problems Father     No Known Problems Mother     No Known Problems Brother     No Known Problems Sister        Allergies:  Patient has no known allergies.    Home Medications:  Prior to Admission medications    Medication Sig Start Date End Date Taking? Authorizing Provider   NIFEdipine (PROCARDIA XL) 30 MG extended release tablet Take 1 tablet by mouth daily 1/29/22   Fauzia Lott,    ibuprofen (ADVIL;MOTRIN) 600 MG tablet Take 1 tablet by mouth every 6 hours as needed for Pain 1/28/22 2/27/22  Linda Patel DO   acetaminophen (TYLENOL) 500 MG tablet Take 2 tablets by mouth every 6 hours as needed for Pain 1/28/22   Linda Patel DO   ferrous sulfate (IRON 325) 325 (65 Fe) MG tablet Take 1 tablet by mouth 2 times daily 1/28/22   Linda Patel DO   famotidine (PEPCID) 20 MG tablet Take 1 tablet by mouth 2 times daily 11/22/21   Obie Oconnell,    Prenatal Vit-Fe Fumarate-FA (PRENATAL VITAMIN) 27-1 MG TABS tablet Take 1 tablet by mouth daily May substitute with any prenatal vit pt insurance will cover 10/11/21   Jose Alberto Sosa MD         REVIEW OF SYSTEMS       Review of Systems   Constitutional:  Negative for chills and fever.   Respiratory:  Negative for cough and shortness of breath.    Cardiovascular:  Negative for chest pain and leg swelling.   Gastrointestinal:  Negative for abdominal pain, diarrhea, nausea and vomiting.   Genitourinary:  Positive for vaginal bleeding. Negative for dysuria and frequency.   Musculoskeletal:  Negative for back pain and neck pain.   Skin:  Negative for rash.   Neurological:  Negative for dizziness, syncope, numbness and headaches.   All other systems reviewed and are

## 2024-07-09 NOTE — ED NOTES
Pt arrived to ED through EMS  Pt refusing to be seen at [facility] due to previous visit in 2022  Pt stated she gave birth in 2022 and the staff on the floor was \"rough\" with her baby   Pt stated \"it wouldn't feel right in my heart to be treated here\"  Pt stated EMS stated they had to bring her to the closest hospital   Pt stated she wants to go [facility]   Pt aware of risks of leaving before treatment complete   Dr lombardo at bedside and witnessed patients statements and decision making capacity

## (undated) DEVICE — SOLUTION SOD CHL 0.9% 1000ML

## (undated) DEVICE — SUTURE COAT VCRL SZ 0 L36IN ABSRB VLT CTX L48MM TAPERPOINT J370H

## (undated) DEVICE — SOLUTION IV IRRIG WATER 500ML POUR BRL ST 2F7113

## (undated) DEVICE — 3M™ STERI-STRIP™ ANTIMICROBIAL SKIN CLOSURES 1 IN X 5 IN, 25/CAR, 4 CAR/CASE A1848: Brand: 3M™ STERI-STRIP™

## (undated) DEVICE — TOWEL SURG W16XL26IN WHT NONFENESTRATED ST 2 PER PK

## (undated) DEVICE — SWAB MEDICATED TINC BENZ

## (undated) DEVICE — KENDALL SCD EXPRESS SLEEVES, KNEE LENGTH, MEDIUM: Brand: KENDALL SCD